# Patient Record
Sex: MALE | Race: WHITE | NOT HISPANIC OR LATINO | Employment: OTHER | URBAN - METROPOLITAN AREA
[De-identification: names, ages, dates, MRNs, and addresses within clinical notes are randomized per-mention and may not be internally consistent; named-entity substitution may affect disease eponyms.]

---

## 2021-12-01 ENCOUNTER — TRANSFERRED RECORDS (OUTPATIENT)
Dept: HEALTH INFORMATION MANAGEMENT | Facility: CLINIC | Age: 71
End: 2021-12-01

## 2022-01-19 ENCOUNTER — TRANSFERRED RECORDS (OUTPATIENT)
Dept: HEALTH INFORMATION MANAGEMENT | Facility: CLINIC | Age: 72
End: 2022-01-19
Payer: COMMERCIAL

## 2022-01-20 ENCOUNTER — TRANSFERRED RECORDS (OUTPATIENT)
Dept: HEALTH INFORMATION MANAGEMENT | Facility: CLINIC | Age: 72
End: 2022-01-20
Payer: COMMERCIAL

## 2022-01-21 ENCOUNTER — TRANSCRIBE ORDERS (OUTPATIENT)
Dept: OTHER | Age: 72
End: 2022-01-21
Payer: COMMERCIAL

## 2022-01-21 ENCOUNTER — MEDICAL CORRESPONDENCE (OUTPATIENT)
Dept: HEALTH INFORMATION MANAGEMENT | Facility: CLINIC | Age: 72
End: 2022-01-21
Payer: COMMERCIAL

## 2022-01-21 DIAGNOSIS — C64.2 CLEAR CELL CARCINOMA OF LEFT KIDNEY (H): ICD-10-CM

## 2022-01-21 DIAGNOSIS — M25.551 ACUTE RIGHT HIP PAIN: Primary | ICD-10-CM

## 2022-01-24 ENCOUNTER — TELEPHONE (OUTPATIENT)
Dept: ORTHOPEDICS | Facility: CLINIC | Age: 72
End: 2022-01-24
Payer: COMMERCIAL

## 2022-01-24 NOTE — TELEPHONE ENCOUNTER
1/25 30 min slot Dr Dinero   1/31 7am Dr Hoff  2/1 30 min slot Dr Dinero   2/3 8:30am Dr Hoff  2/3 2pm, 3pm, 4pm Dr Luke

## 2022-01-24 NOTE — TELEPHONE ENCOUNTER
Patient has a referral to Ortho Oncology regarding Acute right hip pain /Clear cell carcinoma of left kidney. Please assist patient with scheduling appt within appropriate time frame at [phone]. Thank you!

## 2022-01-26 NOTE — TELEPHONE ENCOUNTER
RECORDS RECEIVED FROM: right femoral head lesion // Acute right hip pain /Clear cell carcinoma of left kidney   DATE RECEIVED: Feb 4, 2022     NOTES STATUS DETAILS   OFFICE NOTE from referring provider Received Peri Wilkinson MD     MEDICATION LIST Internal    CT SCAN Received    XRAYS (IMAGES & REPORTS) Received        01/28/22   9:10 AM   FAXED REQUEST TO [facility]  Radha Guzman CMA      01/27/22   3:12 PM   RECEIVED FAX FROM , NO IMAGES THERE IMAGES ARE AT [facility]  Radha Guzman CMA    01/26/22   4:53 PM    FAXED REQUEST TO  FOR IMAGES  Radha Guzman CMA

## 2022-02-04 ENCOUNTER — OFFICE VISIT (OUTPATIENT)
Dept: ORTHOPEDICS | Facility: CLINIC | Age: 72
End: 2022-02-04
Payer: COMMERCIAL

## 2022-02-04 ENCOUNTER — PRE VISIT (OUTPATIENT)
Dept: ORTHOPEDICS | Facility: CLINIC | Age: 72
End: 2022-02-04
Payer: COMMERCIAL

## 2022-02-04 VITALS — BODY MASS INDEX: 26.66 KG/M2 | HEIGHT: 65 IN | WEIGHT: 160 LBS

## 2022-02-04 DIAGNOSIS — M16.7 OTHER SECONDARY OSTEOARTHRITIS OF RIGHT HIP: Primary | ICD-10-CM

## 2022-02-04 DIAGNOSIS — M25.551 ACUTE RIGHT HIP PAIN: ICD-10-CM

## 2022-02-04 DIAGNOSIS — C64.2 CLEAR CELL CARCINOMA OF LEFT KIDNEY (H): ICD-10-CM

## 2022-02-04 PROCEDURE — 99204 OFFICE O/P NEW MOD 45 MIN: CPT | Performed by: ORTHOPAEDIC SURGERY

## 2022-02-04 RX ORDER — ACETAMINOPHEN 500 MG
500 TABLET ORAL EVERY 8 HOURS PRN
Status: ON HOLD | COMMUNITY
End: 2022-03-15

## 2022-02-04 RX ORDER — TRAMADOL HYDROCHLORIDE 50 MG/1
50 TABLET ORAL EVERY 6 HOURS PRN
COMMUNITY
Start: 2021-12-23 | End: 2022-06-21

## 2022-02-04 ASSESSMENT — MIFFLIN-ST. JEOR: SCORE: 1405.76

## 2022-02-04 NOTE — LETTER
2/4/2022         RE: Maximilian Kapoor  [address]  [address]  [place] [place] [address]        Dear Colleague,    Thank you for referring your patient, Maximilian Kapoor, to the [facility] [place]. Please see a copy of my visit note below.    This patient was diagnosed a few months ago with renal cell carcinoma.  He has received 3 rounds of infusion chemotherapy.  He is also had 13 treatments of radiation to the right hip.  He has had persistent right hip pain especially with ambulating.    On examination he is alert oriented has a normal mood and affect and is in no acute distress peer respirations are regular and unlabored eyes are nonicteric.  He is walking with an assistive device.  And is right lower extremity there is no edema or significant erythema.    I reviewed his CT scan and he has a large lytic lesion in the anterior portion of the right femoral head and neck.  There is some collapse of the subchondral bone of the head.  This is undoubtably pain source.  This is consistent with a renal cell carcinoma.    In renal cell carcinoma lesions with this profound bone loss is unlikely that he is going to heal this back in.  Renal cell carcinoma is notoriously resistant to radiation and so we expect that this will begin growing at some point also.  The patient also has significant arthritis in the right hip.  I do not think he is going to get any pain relief until we treat him surgically.  I have recommended a total hip arthroplasty with resection of the involved bone.  He should be able to receive a fairly conventional hip replacement given where the tumor is at this time.  I would not want the tumor to grow as we would lose bone that we would normally want to support a total hip.  This is a great moment to get him fixed.  Our ability to resect the entire area of involved bone will also minimize the risk of persistent and recurrent tumor.  The patient is interested in this plan  going forward.  I would want his immune system and blood counts to be ready for surgery and so he will discuss this with his medical oncologist.  I know he is due for an infusion in 6 days so he will meet with his medical oncologist in 4 days and decide if they are going forward with the surgery now or after his next infusion treatment.  I have answered all of his questions today.      Again, thank you for allowing me to participate in the care of your patient.        Sincerely,        Kaveh Luke MD

## 2022-02-04 NOTE — NURSING NOTE
Teaching Flowsheet   Relevant Diagnosis: Wide resection right femur tumor and right total hip arthroplasty       Teaching Topic: preop     Person(s) involved in teaching:   Patient and Wife     Motivation Level:  Asks Questions: Yes  Eager to Learn: Yes  Cooperative: Yes  Receptive (willing/able to accept information): Yes  Any cultural factors/Gnosticist beliefs that may influence understanding or compliance? No       Patient demonstrates understanding of the following:  Reason for the appointment, diagnosis and treatment plan: Yes  Knowledge of proper use of medications and conditions for which they are ordered (with special attention to potential side effects or drug interactions): Yes  Which situations necessitate calling provider and whom to contact: Yes       Teaching Concerns Addressed:        Proper use and care of soap (medical equip, care aids, etc.): Yes  Nutritional needs and diet plan: Yes  Pain management techniques: Yes  Wound Care: Yes  How and/when to access community resources: Yes     Instructional Materials Used/Given: surgery packet reviewed with patient and his wife.  He will speak with his Oncologist on Tuesday as to the timing of his surgery and his infusion.  He will then call Fadumo and she will give OR date and schedule a virtual appt with PAC as well.  All contents of the jt booklet reviewed.  He will call if any questions arise.  His daughter will take care of him after surgery, since his wife is still recovering from her fall and surgery.  She is still on crutches.

## 2022-02-04 NOTE — LETTER
Date:February 6, 2022      Patient was self referred, no letter generated. Do not send.        [facility] Health Information

## 2022-02-04 NOTE — PROGRESS NOTES
This patient was diagnosed a few months ago with renal cell carcinoma.  He has received 3 rounds of infusion chemotherapy.  He is also had 13 treatments of radiation to the right hip.  He has had persistent right hip pain especially with ambulating.    On examination he is alert oriented has a normal mood and affect and is in no acute distress peer respirations are regular and unlabored eyes are nonicteric.  He is walking with an assistive device.  And is right lower extremity there is no edema or significant erythema.    I reviewed his CT scan and he has a large lytic lesion in the anterior portion of the right femoral head and neck.  There is some collapse of the subchondral bone of the head.  This is undoubtably pain source.  This is consistent with a renal cell carcinoma.    In renal cell carcinoma lesions with this profound bone loss is unlikely that he is going to heal this back in.  Renal cell carcinoma is notoriously resistant to radiation and so we expect that this will begin growing at some point also.  The patient also has significant arthritis in the right hip.  I do not think he is going to get any pain relief until we treat him surgically.  I have recommended a total hip arthroplasty with resection of the involved bone.  He should be able to receive a fairly conventional hip replacement given where the tumor is at this time.  I would not want the tumor to grow as we would lose bone that we would normally want to support a total hip.  This is a great moment to get him fixed.  Our ability to resect the entire area of involved bone will also minimize the risk of persistent and recurrent tumor.  The patient is interested in this plan going forward.  I would want his immune system and blood counts to be ready for surgery and so he will discuss this with his medical oncologist.  I know he is due for an infusion in 6 days so he will meet with his medical oncologist in 4 days and decide if they are going  forward with the surgery now or after his next infusion treatment.  I have answered all of his questions today.

## 2022-02-04 NOTE — NURSING NOTE
"Reason For Visit:   Chief Complaint   Patient presents with     Consult     right femoral head lesion and acutre right hip pain referred by Dr. Enrique Salinas at [facility] //  finished right hip radiation about a month ago at [facility] // currently doing chemo therapy        Ht 1.64 m (5' 4.57\")   Wt 72.6 kg (160 lb)   BMI 26.98 kg/m      Pain Assessment  Patient Currently in Pain: Yes  0-10 Pain Scale: 3  Primary Pain Location: Hip (right hip area)        Soila Dempsey ATC    "

## 2022-02-08 ENCOUNTER — TELEPHONE (OUTPATIENT)
Dept: ORTHOPEDICS | Facility: CLINIC | Age: 72
End: 2022-02-08
Payer: COMMERCIAL

## 2022-02-08 NOTE — TELEPHONE ENCOUNTER
Returned patient's voicemail about scheduling surgery. Left voicemail with [phone] as call back number.

## 2022-02-08 NOTE — TELEPHONE ENCOUNTER
Patient is scheduled for surgery with Dr. Luke    Spoke with: Benjamin    Date of Surgery: 3/14/2022    Location: [facility]    Informed patient they will need an adult  yes    Pre op with Provider n/a    H&P: Scheduled with pcp    Pre-procedure COVID-19 Test: patient will do this locally    Additional imaging/appointments: n/a    Surgery packet: Given in clinic     Additional comments: n/a

## 2022-02-14 DIAGNOSIS — Z11.59 ENCOUNTER FOR SCREENING FOR OTHER VIRAL DISEASES: Primary | ICD-10-CM

## 2022-03-11 RX ORDER — AMLODIPINE BESYLATE 10 MG/1
5 TABLET ORAL EVERY MORNING
Status: ON HOLD | COMMUNITY
End: 2022-03-14

## 2022-03-13 ENCOUNTER — ANESTHESIA EVENT (OUTPATIENT)
Dept: SURGERY | Facility: CLINIC | Age: 72
DRG: 470 | End: 2022-03-13
Payer: COMMERCIAL

## 2022-03-14 ENCOUNTER — APPOINTMENT (OUTPATIENT)
Dept: GENERAL RADIOLOGY | Facility: CLINIC | Age: 72
DRG: 470 | End: 2022-03-14
Attending: ORTHOPAEDIC SURGERY
Payer: COMMERCIAL

## 2022-03-14 ENCOUNTER — ANESTHESIA (OUTPATIENT)
Dept: SURGERY | Facility: CLINIC | Age: 72
DRG: 470 | End: 2022-03-14
Payer: COMMERCIAL

## 2022-03-14 ENCOUNTER — HOSPITAL ENCOUNTER (INPATIENT)
Facility: CLINIC | Age: 72
LOS: 1 days | Discharge: HOME HEALTH | DRG: 470 | End: 2022-03-15
Attending: ORTHOPAEDIC SURGERY | Admitting: ORTHOPAEDIC SURGERY
Payer: COMMERCIAL

## 2022-03-14 DIAGNOSIS — Z98.890 STATUS POST SURGERY: ICD-10-CM

## 2022-03-14 DIAGNOSIS — C40.21: Primary | ICD-10-CM

## 2022-03-14 LAB
ABO/RH(D): NORMAL
ANTIBODY SCREEN: NEGATIVE
GLUCOSE BLDC GLUCOMTR-MCNC: 115 MG/DL (ref 70–99)
SPECIMEN EXPIRATION DATE: NORMAL

## 2022-03-14 PROCEDURE — 88311 DECALCIFY TISSUE: CPT | Mod: TC | Performed by: ORTHOPAEDIC SURGERY

## 2022-03-14 PROCEDURE — 258N000001 HC RX 258: Performed by: ORTHOPAEDIC SURGERY

## 2022-03-14 PROCEDURE — C1776 JOINT DEVICE (IMPLANTABLE): HCPCS | Performed by: ORTHOPAEDIC SURGERY

## 2022-03-14 PROCEDURE — 99232 SBSQ HOSP IP/OBS MODERATE 35: CPT | Performed by: PEDIATRICS

## 2022-03-14 PROCEDURE — 250N000025 HC SEVOFLURANE, PER MIN: Performed by: ORTHOPAEDIC SURGERY

## 2022-03-14 PROCEDURE — 27365 RESECT FEMUR/KNEE TUMOR: CPT | Mod: RT | Performed by: ORTHOPAEDIC SURGERY

## 2022-03-14 PROCEDURE — 27130 TOTAL HIP ARTHROPLASTY: CPT | Mod: RT | Performed by: ORTHOPAEDIC SURGERY

## 2022-03-14 PROCEDURE — 250N000009 HC RX 250

## 2022-03-14 PROCEDURE — 86901 BLOOD TYPING SEROLOGIC RH(D): CPT | Performed by: ORTHOPAEDIC SURGERY

## 2022-03-14 PROCEDURE — C1713 ANCHOR/SCREW BN/BN,TIS/BN: HCPCS | Performed by: ORTHOPAEDIC SURGERY

## 2022-03-14 PROCEDURE — 73502 X-RAY EXAM HIP UNI 2-3 VIEWS: CPT | Mod: 26 | Performed by: RADIOLOGY

## 2022-03-14 PROCEDURE — 99207 PR CONSULT E&M CHANGED TO SUBSEQUENT LEVEL: CPT | Performed by: PEDIATRICS

## 2022-03-14 PROCEDURE — 250N000013 HC RX MED GY IP 250 OP 250 PS 637: Performed by: STUDENT IN AN ORGANIZED HEALTH CARE EDUCATION/TRAINING PROGRAM

## 2022-03-14 PROCEDURE — 258N000003 HC RX IP 258 OP 636

## 2022-03-14 PROCEDURE — 258N000003 HC RX IP 258 OP 636: Performed by: STUDENT IN AN ORGANIZED HEALTH CARE EDUCATION/TRAINING PROGRAM

## 2022-03-14 PROCEDURE — 0SR90JA REPLACEMENT OF RIGHT HIP JOINT WITH SYNTHETIC SUBSTITUTE, UNCEMENTED, OPEN APPROACH: ICD-10-PCS | Performed by: ORTHOPAEDIC SURGERY

## 2022-03-14 PROCEDURE — 250N000011 HC RX IP 250 OP 636: Performed by: PHYSICIAN ASSISTANT

## 2022-03-14 PROCEDURE — 250N000009 HC RX 250: Performed by: ORTHOPAEDIC SURGERY

## 2022-03-14 PROCEDURE — 0QT60ZZ RESECTION OF RIGHT UPPER FEMUR, OPEN APPROACH: ICD-10-PCS | Performed by: ORTHOPAEDIC SURGERY

## 2022-03-14 PROCEDURE — 272N000001 HC OR GENERAL SUPPLY STERILE: Performed by: ORTHOPAEDIC SURGERY

## 2022-03-14 PROCEDURE — 250N000013 HC RX MED GY IP 250 OP 250 PS 637: Performed by: PHYSICIAN ASSISTANT

## 2022-03-14 PROCEDURE — 120N000002 HC R&B MED SURG/OB UMMC

## 2022-03-14 PROCEDURE — 250N000009 HC RX 250: Performed by: NURSE ANESTHETIST, CERTIFIED REGISTERED

## 2022-03-14 PROCEDURE — 999N000065 XR PELVIS AND HIP PORTABLE RIGHT 2 VIEWS

## 2022-03-14 PROCEDURE — 370N000017 HC ANESTHESIA TECHNICAL FEE, PER MIN: Performed by: ORTHOPAEDIC SURGERY

## 2022-03-14 PROCEDURE — 360N000078 HC SURGERY LEVEL 5, PER MIN: Performed by: ORTHOPAEDIC SURGERY

## 2022-03-14 PROCEDURE — 250N000011 HC RX IP 250 OP 636: Performed by: ANESTHESIOLOGY

## 2022-03-14 PROCEDURE — 250N000011 HC RX IP 250 OP 636: Performed by: STUDENT IN AN ORGANIZED HEALTH CARE EDUCATION/TRAINING PROGRAM

## 2022-03-14 PROCEDURE — 250N000013 HC RX MED GY IP 250 OP 250 PS 637: Performed by: ANESTHESIOLOGY

## 2022-03-14 PROCEDURE — 86850 RBC ANTIBODY SCREEN: CPT | Performed by: ORTHOPAEDIC SURGERY

## 2022-03-14 PROCEDURE — 250N000011 HC RX IP 250 OP 636

## 2022-03-14 PROCEDURE — 710N000010 HC RECOVERY PHASE 1, LEVEL 2, PER MIN: Performed by: ORTHOPAEDIC SURGERY

## 2022-03-14 PROCEDURE — 999N000141 HC STATISTIC PRE-PROCEDURE NURSING ASSESSMENT: Performed by: ORTHOPAEDIC SURGERY

## 2022-03-14 DEVICE — 127 DEGREE NECK ANGLE HIP STEM
Type: IMPLANTABLE DEVICE | Site: HIP | Status: FUNCTIONAL
Brand: SECUR-FIT

## 2022-03-14 DEVICE — 6.5MM LOW PROFILE HEX SCREW 45MM
Type: IMPLANTABLE DEVICE | Site: HIP | Status: FUNCTIONAL
Brand: TRIDENT II

## 2022-03-14 DEVICE — CLUSTER ACETABULAR SHELL
Type: IMPLANTABLE DEVICE | Site: HIP | Status: FUNCTIONAL
Brand: TRIDENT

## 2022-03-14 DEVICE — FEMORAL HEAD
Type: IMPLANTABLE DEVICE | Site: HIP | Status: FUNCTIONAL
Brand: LFIT

## 2022-03-14 DEVICE — TRIDENT X3 0 DEGREE POLYETHYLENE INSERT
Type: IMPLANTABLE DEVICE | Site: HIP | Status: FUNCTIONAL
Brand: TRIDENT X3 INSERT

## 2022-03-14 DEVICE — 6.5MM LOW PROFILE HEX SCREW 35MM
Type: IMPLANTABLE DEVICE | Site: HIP | Status: FUNCTIONAL
Brand: TRIDENT II

## 2022-03-14 RX ORDER — HYDROMORPHONE HCL IN WATER/PF 6 MG/30 ML
0.2 PATIENT CONTROLLED ANALGESIA SYRINGE INTRAVENOUS
Status: DISCONTINUED | OUTPATIENT
Start: 2022-03-14 | End: 2022-03-15 | Stop reason: HOSPADM

## 2022-03-14 RX ORDER — NALOXONE HYDROCHLORIDE 0.4 MG/ML
0.4 INJECTION, SOLUTION INTRAMUSCULAR; INTRAVENOUS; SUBCUTANEOUS
Status: DISCONTINUED | OUTPATIENT
Start: 2022-03-14 | End: 2022-03-15 | Stop reason: HOSPADM

## 2022-03-14 RX ORDER — AMLODIPINE BESYLATE 5 MG/1
5 TABLET ORAL DAILY
COMMUNITY

## 2022-03-14 RX ORDER — POLYETHYLENE GLYCOL 3350 17 G/17G
17 POWDER, FOR SOLUTION ORAL DAILY
Status: DISCONTINUED | OUTPATIENT
Start: 2022-03-15 | End: 2022-03-15 | Stop reason: HOSPADM

## 2022-03-14 RX ORDER — DEXAMETHASONE SODIUM PHOSPHATE 4 MG/ML
INJECTION, SOLUTION INTRA-ARTICULAR; INTRALESIONAL; INTRAMUSCULAR; INTRAVENOUS; SOFT TISSUE PRN
Status: DISCONTINUED | OUTPATIENT
Start: 2022-03-14 | End: 2022-03-14

## 2022-03-14 RX ORDER — CEFAZOLIN SODIUM/WATER 2 G/20 ML
2 SYRINGE (ML) INTRAVENOUS SEE ADMIN INSTRUCTIONS
Status: DISCONTINUED | OUTPATIENT
Start: 2022-03-14 | End: 2022-03-14 | Stop reason: HOSPADM

## 2022-03-14 RX ORDER — ONDANSETRON 4 MG/1
4 TABLET, ORALLY DISINTEGRATING ORAL EVERY 30 MIN PRN
Status: DISCONTINUED | OUTPATIENT
Start: 2022-03-14 | End: 2022-03-14 | Stop reason: HOSPADM

## 2022-03-14 RX ORDER — HYDROMORPHONE HCL IN WATER/PF 6 MG/30 ML
0.4 PATIENT CONTROLLED ANALGESIA SYRINGE INTRAVENOUS
Status: DISCONTINUED | OUTPATIENT
Start: 2022-03-14 | End: 2022-03-15 | Stop reason: HOSPADM

## 2022-03-14 RX ORDER — NALOXONE HYDROCHLORIDE 0.4 MG/ML
0.2 INJECTION, SOLUTION INTRAMUSCULAR; INTRAVENOUS; SUBCUTANEOUS
Status: DISCONTINUED | OUTPATIENT
Start: 2022-03-14 | End: 2022-03-15 | Stop reason: HOSPADM

## 2022-03-14 RX ORDER — CEFAZOLIN SODIUM 1 G/3ML
1 INJECTION, POWDER, FOR SOLUTION INTRAMUSCULAR; INTRAVENOUS EVERY 8 HOURS
Status: COMPLETED | OUTPATIENT
Start: 2022-03-14 | End: 2022-03-15

## 2022-03-14 RX ORDER — ONDANSETRON 4 MG/1
4 TABLET, ORALLY DISINTEGRATING ORAL EVERY 6 HOURS PRN
Status: DISCONTINUED | OUTPATIENT
Start: 2022-03-14 | End: 2022-03-15 | Stop reason: HOSPADM

## 2022-03-14 RX ORDER — BISACODYL 10 MG
10 SUPPOSITORY, RECTAL RECTAL DAILY PRN
Status: DISCONTINUED | OUTPATIENT
Start: 2022-03-14 | End: 2022-03-15 | Stop reason: HOSPADM

## 2022-03-14 RX ORDER — CALCIUM CARBONATE 500 MG/1
500 TABLET, CHEWABLE ORAL 4 TIMES DAILY PRN
Status: DISCONTINUED | OUTPATIENT
Start: 2022-03-14 | End: 2022-03-15 | Stop reason: HOSPADM

## 2022-03-14 RX ORDER — SODIUM CHLORIDE, SODIUM LACTATE, POTASSIUM CHLORIDE, CALCIUM CHLORIDE 600; 310; 30; 20 MG/100ML; MG/100ML; MG/100ML; MG/100ML
INJECTION, SOLUTION INTRAVENOUS CONTINUOUS
Status: DISCONTINUED | OUTPATIENT
Start: 2022-03-14 | End: 2022-03-14 | Stop reason: HOSPADM

## 2022-03-14 RX ORDER — HYDROMORPHONE HCL IN WATER/PF 6 MG/30 ML
0.2 PATIENT CONTROLLED ANALGESIA SYRINGE INTRAVENOUS EVERY 5 MIN PRN
Status: DISCONTINUED | OUTPATIENT
Start: 2022-03-14 | End: 2022-03-14 | Stop reason: HOSPADM

## 2022-03-14 RX ORDER — ACETAMINOPHEN 325 MG/1
975 TABLET ORAL EVERY 8 HOURS
Status: DISCONTINUED | OUTPATIENT
Start: 2022-03-14 | End: 2022-03-15 | Stop reason: HOSPADM

## 2022-03-14 RX ORDER — LIDOCAINE 40 MG/G
CREAM TOPICAL
Status: DISCONTINUED | OUTPATIENT
Start: 2022-03-14 | End: 2022-03-14 | Stop reason: HOSPADM

## 2022-03-14 RX ORDER — ONDANSETRON 2 MG/ML
4 INJECTION INTRAMUSCULAR; INTRAVENOUS EVERY 6 HOURS PRN
Status: DISCONTINUED | OUTPATIENT
Start: 2022-03-14 | End: 2022-03-15 | Stop reason: HOSPADM

## 2022-03-14 RX ORDER — TRANEXAMIC ACID 650 MG/1
1950 TABLET ORAL ONCE
Status: COMPLETED | OUTPATIENT
Start: 2022-03-14 | End: 2022-03-14

## 2022-03-14 RX ORDER — FENTANYL CITRATE 50 UG/ML
INJECTION, SOLUTION INTRAMUSCULAR; INTRAVENOUS PRN
Status: DISCONTINUED | OUTPATIENT
Start: 2022-03-14 | End: 2022-03-14

## 2022-03-14 RX ORDER — LIDOCAINE HYDROCHLORIDE 20 MG/ML
INJECTION, SOLUTION INFILTRATION; PERINEURAL PRN
Status: DISCONTINUED | OUTPATIENT
Start: 2022-03-14 | End: 2022-03-14

## 2022-03-14 RX ORDER — OXYCODONE HYDROCHLORIDE 5 MG/1
5 TABLET ORAL EVERY 4 HOURS PRN
Status: DISCONTINUED | OUTPATIENT
Start: 2022-03-14 | End: 2022-03-15 | Stop reason: HOSPADM

## 2022-03-14 RX ORDER — HYDROXYZINE HYDROCHLORIDE 10 MG/1
10 TABLET, FILM COATED ORAL EVERY 6 HOURS PRN
Status: DISCONTINUED | OUTPATIENT
Start: 2022-03-14 | End: 2022-03-15 | Stop reason: HOSPADM

## 2022-03-14 RX ORDER — LABETALOL HYDROCHLORIDE 5 MG/ML
5-10 INJECTION, SOLUTION INTRAVENOUS
Status: DISCONTINUED | OUTPATIENT
Start: 2022-03-14 | End: 2022-03-14 | Stop reason: HOSPADM

## 2022-03-14 RX ORDER — OXYCODONE HYDROCHLORIDE 10 MG/1
10 TABLET ORAL EVERY 4 HOURS PRN
Status: DISCONTINUED | OUTPATIENT
Start: 2022-03-14 | End: 2022-03-15 | Stop reason: HOSPADM

## 2022-03-14 RX ORDER — ONDANSETRON 2 MG/ML
INJECTION INTRAMUSCULAR; INTRAVENOUS PRN
Status: DISCONTINUED | OUTPATIENT
Start: 2022-03-14 | End: 2022-03-14

## 2022-03-14 RX ORDER — PROPOFOL 10 MG/ML
INJECTION, EMULSION INTRAVENOUS PRN
Status: DISCONTINUED | OUTPATIENT
Start: 2022-03-14 | End: 2022-03-14

## 2022-03-14 RX ORDER — OXYCODONE HYDROCHLORIDE 5 MG/1
5 TABLET ORAL EVERY 4 HOURS PRN
Status: DISCONTINUED | OUTPATIENT
Start: 2022-03-14 | End: 2022-03-14 | Stop reason: HOSPADM

## 2022-03-14 RX ORDER — METHOCARBAMOL 500 MG/1
500 TABLET, FILM COATED ORAL EVERY 6 HOURS PRN
Status: DISCONTINUED | OUTPATIENT
Start: 2022-03-14 | End: 2022-03-15 | Stop reason: HOSPADM

## 2022-03-14 RX ORDER — FENTANYL CITRATE 50 UG/ML
25 INJECTION, SOLUTION INTRAMUSCULAR; INTRAVENOUS EVERY 5 MIN PRN
Status: DISCONTINUED | OUTPATIENT
Start: 2022-03-14 | End: 2022-03-14 | Stop reason: HOSPADM

## 2022-03-14 RX ORDER — DEXMEDETOMIDINE HYDROCHLORIDE 4 UG/ML
INJECTION, SOLUTION INTRAVENOUS PRN
Status: DISCONTINUED | OUTPATIENT
Start: 2022-03-14 | End: 2022-03-14

## 2022-03-14 RX ORDER — SODIUM CHLORIDE, SODIUM LACTATE, POTASSIUM CHLORIDE, CALCIUM CHLORIDE 600; 310; 30; 20 MG/100ML; MG/100ML; MG/100ML; MG/100ML
INJECTION, SOLUTION INTRAVENOUS CONTINUOUS PRN
Status: DISCONTINUED | OUTPATIENT
Start: 2022-03-14 | End: 2022-03-14

## 2022-03-14 RX ORDER — HYDROXYZINE HYDROCHLORIDE 10 MG/1
10 TABLET, FILM COATED ORAL EVERY 6 HOURS PRN
COMMUNITY

## 2022-03-14 RX ORDER — ACETAMINOPHEN 325 MG/1
650 TABLET ORAL EVERY 4 HOURS PRN
Status: DISCONTINUED | OUTPATIENT
Start: 2022-03-17 | End: 2022-03-15 | Stop reason: HOSPADM

## 2022-03-14 RX ORDER — SODIUM CHLORIDE, SODIUM LACTATE, POTASSIUM CHLORIDE, CALCIUM CHLORIDE 600; 310; 30; 20 MG/100ML; MG/100ML; MG/100ML; MG/100ML
INJECTION, SOLUTION INTRAVENOUS CONTINUOUS
Status: DISCONTINUED | OUTPATIENT
Start: 2022-03-14 | End: 2022-03-15 | Stop reason: HOSPADM

## 2022-03-14 RX ORDER — CEFAZOLIN SODIUM/WATER 2 G/20 ML
2 SYRINGE (ML) INTRAVENOUS
Status: COMPLETED | OUTPATIENT
Start: 2022-03-14 | End: 2022-03-14

## 2022-03-14 RX ORDER — PROCHLORPERAZINE MALEATE 5 MG
5 TABLET ORAL EVERY 6 HOURS PRN
Status: DISCONTINUED | OUTPATIENT
Start: 2022-03-14 | End: 2022-03-15 | Stop reason: HOSPADM

## 2022-03-14 RX ORDER — LIDOCAINE 40 MG/G
CREAM TOPICAL
Status: DISCONTINUED | OUTPATIENT
Start: 2022-03-14 | End: 2022-03-15 | Stop reason: HOSPADM

## 2022-03-14 RX ORDER — ONDANSETRON 2 MG/ML
4 INJECTION INTRAMUSCULAR; INTRAVENOUS EVERY 30 MIN PRN
Status: DISCONTINUED | OUTPATIENT
Start: 2022-03-14 | End: 2022-03-14 | Stop reason: HOSPADM

## 2022-03-14 RX ORDER — MAGNESIUM HYDROXIDE 1200 MG/15ML
LIQUID ORAL PRN
Status: DISCONTINUED | OUTPATIENT
Start: 2022-03-14 | End: 2022-03-14 | Stop reason: HOSPADM

## 2022-03-14 RX ORDER — AMOXICILLIN 250 MG
1 CAPSULE ORAL 2 TIMES DAILY
Status: DISCONTINUED | OUTPATIENT
Start: 2022-03-14 | End: 2022-03-15 | Stop reason: HOSPADM

## 2022-03-14 RX ADMIN — HYDROMORPHONE HYDROCHLORIDE 0.25 MG: 1 INJECTION, SOLUTION INTRAMUSCULAR; INTRAVENOUS; SUBCUTANEOUS at 12:56

## 2022-03-14 RX ADMIN — SUGAMMADEX 200 MG: 100 INJECTION, SOLUTION INTRAVENOUS at 15:02

## 2022-03-14 RX ADMIN — PHENYLEPHRINE HYDROCHLORIDE 100 MCG: 10 INJECTION INTRAVENOUS at 12:26

## 2022-03-14 RX ADMIN — TRANEXAMIC ACID 1950 MG: 650 TABLET ORAL at 09:02

## 2022-03-14 RX ADMIN — PHENYLEPHRINE HYDROCHLORIDE 50 MCG: 10 INJECTION INTRAVENOUS at 13:49

## 2022-03-14 RX ADMIN — ACETAMINOPHEN 975 MG: 325 TABLET, FILM COATED ORAL at 16:35

## 2022-03-14 RX ADMIN — FENTANYL CITRATE 25 MCG: 50 INJECTION, SOLUTION INTRAMUSCULAR; INTRAVENOUS at 14:58

## 2022-03-14 RX ADMIN — Medication 12 MCG: at 13:02

## 2022-03-14 RX ADMIN — PHENYLEPHRINE HYDROCHLORIDE 50 MCG: 10 INJECTION INTRAVENOUS at 14:03

## 2022-03-14 RX ADMIN — LABETALOL HYDROCHLORIDE 5 MG: 5 INJECTION, SOLUTION INTRAVENOUS at 16:19

## 2022-03-14 RX ADMIN — ONDANSETRON 4 MG: 2 INJECTION INTRAMUSCULAR; INTRAVENOUS at 12:51

## 2022-03-14 RX ADMIN — ROCURONIUM BROMIDE 20 MG: 50 INJECTION, SOLUTION INTRAVENOUS at 12:59

## 2022-03-14 RX ADMIN — HYDROMORPHONE HYDROCHLORIDE 0.25 MG: 1 INJECTION, SOLUTION INTRAMUSCULAR; INTRAVENOUS; SUBCUTANEOUS at 12:51

## 2022-03-14 RX ADMIN — DEXAMETHASONE SODIUM PHOSPHATE 4 MG: 4 INJECTION, SOLUTION INTRAMUSCULAR; INTRAVENOUS at 12:56

## 2022-03-14 RX ADMIN — SODIUM CHLORIDE, POTASSIUM CHLORIDE, SODIUM LACTATE AND CALCIUM CHLORIDE: 600; 310; 30; 20 INJECTION, SOLUTION INTRAVENOUS at 17:23

## 2022-03-14 RX ADMIN — HYDROMORPHONE HYDROCHLORIDE 0.2 MG: 1 INJECTION, SOLUTION INTRAMUSCULAR; INTRAVENOUS; SUBCUTANEOUS at 16:11

## 2022-03-14 RX ADMIN — SENNOSIDES AND DOCUSATE SODIUM 1 TABLET: 50; 8.6 TABLET ORAL at 20:30

## 2022-03-14 RX ADMIN — ROCURONIUM BROMIDE 30 MG: 50 INJECTION, SOLUTION INTRAVENOUS at 13:42

## 2022-03-14 RX ADMIN — CEFAZOLIN 1 G: 1 INJECTION, POWDER, FOR SOLUTION INTRAMUSCULAR; INTRAVENOUS at 20:30

## 2022-03-14 RX ADMIN — FENTANYL CITRATE 25 MCG: 50 INJECTION INTRAMUSCULAR; INTRAVENOUS at 15:33

## 2022-03-14 RX ADMIN — FENTANYL CITRATE 50 MCG: 50 INJECTION, SOLUTION INTRAMUSCULAR; INTRAVENOUS at 12:12

## 2022-03-14 RX ADMIN — FENTANYL CITRATE 50 MCG: 50 INJECTION, SOLUTION INTRAMUSCULAR; INTRAVENOUS at 13:16

## 2022-03-14 RX ADMIN — ROCURONIUM BROMIDE 50 MG: 50 INJECTION, SOLUTION INTRAVENOUS at 12:18

## 2022-03-14 RX ADMIN — PROPOFOL 50 MG: 10 INJECTION, EMULSION INTRAVENOUS at 12:19

## 2022-03-14 RX ADMIN — HYDROMORPHONE HYDROCHLORIDE 0.5 MG: 1 INJECTION, SOLUTION INTRAMUSCULAR; INTRAVENOUS; SUBCUTANEOUS at 13:25

## 2022-03-14 RX ADMIN — FENTANYL CITRATE 25 MCG: 50 INJECTION INTRAMUSCULAR; INTRAVENOUS at 15:26

## 2022-03-14 RX ADMIN — FENTANYL CITRATE 25 MCG: 50 INJECTION INTRAMUSCULAR; INTRAVENOUS at 16:03

## 2022-03-14 RX ADMIN — Medication 2 G: at 12:24

## 2022-03-14 RX ADMIN — PROPOFOL 150 MG: 10 INJECTION, EMULSION INTRAVENOUS at 12:17

## 2022-03-14 RX ADMIN — SODIUM CHLORIDE, POTASSIUM CHLORIDE, SODIUM LACTATE AND CALCIUM CHLORIDE: 600; 310; 30; 20 INJECTION, SOLUTION INTRAVENOUS at 12:08

## 2022-03-14 RX ADMIN — LIDOCAINE HYDROCHLORIDE 60 MG: 20 INJECTION, SOLUTION INFILTRATION; PERINEURAL at 12:17

## 2022-03-14 RX ADMIN — FENTANYL CITRATE 50 MCG: 50 INJECTION, SOLUTION INTRAMUSCULAR; INTRAVENOUS at 12:34

## 2022-03-14 RX ADMIN — FENTANYL CITRATE 25 MCG: 50 INJECTION INTRAMUSCULAR; INTRAVENOUS at 15:45

## 2022-03-14 RX ADMIN — OXYCODONE HYDROCHLORIDE 5 MG: 5 TABLET ORAL at 16:35

## 2022-03-14 RX ADMIN — FENTANYL CITRATE 50 MCG: 50 INJECTION, SOLUTION INTRAMUSCULAR; INTRAVENOUS at 13:06

## 2022-03-14 RX ADMIN — SODIUM CHLORIDE, POTASSIUM CHLORIDE, SODIUM LACTATE AND CALCIUM CHLORIDE: 600; 310; 30; 20 INJECTION, SOLUTION INTRAVENOUS at 15:07

## 2022-03-14 RX ADMIN — FENTANYL CITRATE 25 MCG: 50 INJECTION, SOLUTION INTRAMUSCULAR; INTRAVENOUS at 14:47

## 2022-03-14 ASSESSMENT — ACTIVITIES OF DAILY LIVING (ADL)
ADLS_ACUITY_SCORE: 7
ADLS_ACUITY_SCORE: 9
ADLS_ACUITY_SCORE: 9
ADLS_ACUITY_SCORE: 7
ADLS_ACUITY_SCORE: 7
ADLS_ACUITY_SCORE: 9
ADLS_ACUITY_SCORE: 7
ADLS_ACUITY_SCORE: 6
ADLS_ACUITY_SCORE: 9
ADLS_ACUITY_SCORE: 9
ADLS_ACUITY_SCORE: 7
ADLS_ACUITY_SCORE: 7

## 2022-03-14 ASSESSMENT — LIFESTYLE VARIABLES: TOBACCO_USE: 0

## 2022-03-14 ASSESSMENT — COPD QUESTIONNAIRES: COPD: 0

## 2022-03-14 ASSESSMENT — ENCOUNTER SYMPTOMS: ORTHOPNEA: 0

## 2022-03-14 NOTE — ANESTHESIA POSTPROCEDURE EVALUATION
Patient: Maximilian Kapoor    Procedure: Procedure(s):  Wide resection right femur tumor and right total hip arthroplasty       Anesthesia Type:  General    Note:  Disposition: Inpatient   Postop Pain Control: Uneventful            Sign Out: Well controlled pain   PONV: No   Neuro/Psych: Uneventful            Sign Out: Acceptable/Baseline neuro status   Airway/Respiratory: Uneventful            Sign Out: Acceptable/Baseline resp. status   CV/Hemodynamics: Uneventful            Sign Out: Acceptable CV status; No obvious hypovolemia; No obvious fluid overload   Other NRE: NONE   DID A NON-ROUTINE EVENT OCCUR? No    Event details/Postop Comments:  Doing well. Alert, oriented. No sore throat, nausea, or problems with pain control. No numbness/tingling in the extremities and moving upper extremities spontaneously. SBP up to 180 in PACU, baseline 150s/90s. Treated with prn IV labetalol with BP down to 150s/90s. Patient denies any concerns.              Last vitals:  Vitals Value Taken Time   /98 03/14/22 1645   Temp 36.8  C (98.2  F) 03/14/22 1645   Pulse 81 03/14/22 1645   Resp 16 03/14/22 1645   SpO2 96 % 03/14/22 1648   Vitals shown include unvalidated device data.    Electronically Signed By: Deborah Thompson MD  March 14, 2022  4:49 PM

## 2022-03-14 NOTE — ANESTHESIA CARE TRANSFER NOTE
Patient: Maximilian Kapoor    Procedure: Procedure(s):  Wide resection right femur tumor and right total hip arthroplasty       Diagnosis: Clear cell carcinoma of left kidney (H) [C64.2]  Other secondary osteoarthritis of right hip [M16.7]  Diagnosis Additional Information: No value filed.    Anesthesia Type:   General     Note:    Oropharynx: oropharynx clear of all foreign objects and spontaneously breathing  Level of Consciousness: drowsy  Oxygen Supplementation: face mask  Level of Supplemental Oxygen (L/min / FiO2): 6    Dentition: dentition unchanged  Vital Signs Stable: post-procedure vital signs reviewed and stable  Report to RN Given: handoff report given  Patient transferred to: PACU  Comments: To PACU with 02, Spont RR. Monitors applied, VSS, PIV/airway patent, Report to RN all questions/concerns answered.     Handoff Report: Identifed the Patient, Identified the Reponsible Provider, Reviewed the pertinent medical history, Discussed the surgical course, Reviewed Intra-OP anesthesia mangement and issues during anesthesia, Set expectations for post-procedure period and Allowed opportunity for questions and acknowledgement of understanding      Vitals:  Vitals Value Taken Time   /127 03/14/22 1511   Temp 36.5    Pulse 81 03/14/22 1515   Resp 12 03/14/22 1515   SpO2 100 % 03/14/22 1515   Vitals shown include unvalidated device data.    Electronically Signed By: CRISTINA Austin CRNA  March 14, 2022  3:16 PM

## 2022-03-14 NOTE — PROGRESS NOTES
"  VS: BP (!) 170/88 (BP Location: Left arm)   Pulse (!) 124   Temp (!) 95.8  F (35.4  C) (Oral)   Resp 16   Ht 1.651 m (5' 5\")   Wt 72.3 kg (159 lb 6.3 oz)   SpO2 97%   BMI 26.52 kg/m     O2: Nasal cannula 2LPM, lung sounds clear and equal   Output: Voids in BR   Last BM: 3/13 per pt   Activity: as tolerated, pt ambulated to BR, gait belt walker   Skin: R hip incision   Pain: Lower back pain when sitting up, repositioning pt was effective, declined pain medication, ice applied to R hip   CMS: Intact, A/O x4, denies numbness/tingling   Dressing: R hip CDI   Diet: regular   LDA: R PIV infusing, L PIV SL   Equipment: IV pole, capno, walker, personal belongings   Plan: Continue to monitor   Additional Info: Pt has elevated BP, pt states this is his baseline, pt is pleasant to talk to and able to make needs known       "

## 2022-03-14 NOTE — PHARMACY-ADMISSION MEDICATION HISTORY
Admission Medication History Completed by Pharmacy    See [facility] Admission Navigator for allergy information, preferred outpatient pharmacy, prior to admission medications and immunization status.     Medication History Sources:     Patient    Medication fill history    Changes made to PTA medication list (reason):    Added: Hydroxyzine    Deleted: None    Changed: Updated acetaminophen and tramadol sigs    Additional Information:    Losartan: 25 mg po daily. Pt has not started it yet. Will finish total 20 days of amlodipine 5 mg po daily. Then switch it to losartan.     Prior to Admission medications    Medication Sig Last Dose Taking? Auth Provider   acetaminophen (TYLENOL) 500 MG tablet Take 500 mg by mouth every 8 hours as needed  3/13/2022 at Unknown time Yes Reported, Patient   amLODIPine (NORVASC) 5 MG tablet Take 5 mg by mouth daily 3/12/2022 at Unknown time Yes Unknown, Entered By History   hydrOXYzine (ATARAX) 10 MG tablet Take 10 mg by mouth every 6 hours as needed for itching Past Month at Unknown time Yes Unknown, Entered By History   traMADol (ULTRAM) 50 MG tablet Take 50 mg by mouth every 6 hours as needed  3/13/2022 at Unknown time Yes Reported, Patient       Date completed: 03/14/22    Medication history completed by: Michela Del Rio [facility]

## 2022-03-14 NOTE — OP NOTE
DATE OF SURGERY: 3/14/2022    PREOPERATIVE DIAGNOSIS: #1 right femoral head and neck metastatic carcinoma, #2 right hip osteoarthritis    POSTOPERATIVE DIAGNOSIS: #1 right femoral head and neck metastatic carcinoma, #2 right hip osteoarthritis    PROCEDURE: #1 wide resection proximal femur, #2 right total hip arthroplasty    SURGEON: Kaveh Luke MD     ASSISTANT: Vivien Deras MD    PATIENT HISTORY: This patient has metastatic renal cell carcinoma affecting the right femoral head and neck.  He has had pain there.  He presents now to have this metastatic disease treated.  We are often doing wide resections were reconstruction is possible in the patient understands the risks of infection fracture pain bleeding leg length discrepancy limp numbness tingling weakness deep venous thrombosis pulmonary embolism.  He understands also this procedure is not expected to be curative with regard to his metastatic carcinoma.    DESCRIPTION OF PROCEDURE: The patient underwent successful induction of general anesthesia.  The right leg was washed and sterilely prepped and draped after he had been positioned in a lateral position with hip positioners.  We made an incision for a posterior approach to the hip sharply through skin divided subcutaneous tissue with cautery and opened up the muscle fascia.  Piriformis muscle was tagged to release the capsule split.  The hip was dislocated.  A femoral neck cut was lower than I usually would make it in order to get all the tumor out.  I made a low cut on the medial neck with a saw and then used osteotome over the lateral aspect to complete the resection.  The specimen was sent to pathology.  We also curetted out all the bone marrow at this level of the cut to ensure that we had cleared the area of tumor.  We cauterize a small bleeding vessel.  The bone had no remarkable bleeding.  We then I used the box osteotome, canal finder and lateralizer to start opening up the femur.  We did  straight reamers up to a size 9 and also broached up to a 9.  Next we turned our attention to the acetabulum.  I remove the labrum and we reamed up to a 52.  We impacted our cup and got a good press-fit.  We placed a trial liner and then tried a high offset +0 head.  We are happy with the leg lengths and the patient had almost 45 degrees of internal rotation 90 degrees of flexion.  Her to improve this to remove some of the anterior capsule and shave down a little of the greater trochanter.  The patient now was able to get to 55 or 60 degrees of internal rotation at 90 degrees of flexion.  We then removed all the trial components.  I placed 2 screws up into the acetabulum with excellent purchase.  The acetabular liner was impacted.  We before removing the broach of the femur use a calcar reamer, irrigated the canal, and then impacted the stem.  We tried a +0 head and were still happy with the stability and range of motion.  We then cleaned and dried the taper and impacted the stainless steel +0 head.  The hip was reduced and copiously irrigated.  We repaired the capsule with 1 Vicryl in reattach the piriformis to the tip of the greater trochanter with #1 Vicryl.  0 Vicryl was used in the gluteus fascia followed by 2-0 Vicryl in the subcutaneous tissue Monocryl in the skin Steri-Strips and a sterile dry dressing.  The patient was extubated and taken to the recovery room in stable condition.  There were no complications.  I was present for all critical portions of the procedure.  The estimated blood loss is 150 mL.    Kaveh Luke MD

## 2022-03-14 NOTE — OR NURSING
PACU to Inpatient Nursing Handoff    Patient Maximilian Kapoor is a 72 year old male who speaks English.   Procedure Procedure(s):  Wide resection right femur tumor and right total hip arthroplasty   Surgeon(s) Primary: Kaveh Luke MD  Resident - Assisting: Vivien Deras MD     No Known Allergies    Isolation  none    Past Medical History   has a past medical history of Gout, Hypertension, and Renal disease.    Anesthesia General   Dermatome Level     Preop Meds TXA 1950mg - time given: 0902   Nerve block Not applicable   Intraop Meds dexamethasone (Decadron)  dexmedetomidine (Precedex): 12 mcg total  fentanyl (Sublimaze): 250 mcg total  hydromorphone (Dilaudid): 1.0 mg total  ondansetron (Zofran): last given at 1251   Local Meds No   Antibiotics cefazolin (Ancef) - last given at 1224     Pain Patient Currently in Pain: yes   PACU meds  acetaminophen (Tylenol): 975 mg (total dose) last given at 1635   fentanyl (Sublimaze): 100 mcg (total dose) last given at 1613   hydromorphone (Dilaudid): 0.2 mg (total dose) last given at 1613   oxycodone (Roxicodone): 5 mg (total dose) last given at 1635    PCA / epidural No   Capnography Respiratory Monitoring (EtCO2): 37 mmHg  Integrated Pulmonary Index (IPI): 10   Telemetry ECG Rhythm: Normal sinus rhythm   Inpatient Telemetry Monitor Ordered? No        Labs Glucose Lab Results   Component Value Date     03/14/2022       Hgb No results found for: HGB    INR No results found for: INR   PACU Imaging Completed     Wound/Incision Incision/Surgical Site 03/14/22 Right;Lateral;Upper Thigh (Active)   Incision Assessment UTV 03/14/22 1511   Jenise-Incision Assessment UTV 03/14/22 1511   Closure RASHIDA 03/14/22 1511   Dressing Intervention Clean, dry, intact 03/14/22 1511   Number of days: 0       Incision/Surgical Site 03/14/22 Right Hip (Active)   Number of days: 0      CMS        Equipment ice pack and abductor pillow   Other LDA       IV Access Peripheral  IV 03/14/22 Anterior;Right Wrist (Active)   Site Assessment WDL 03/14/22 1511   Line Status Infusing 03/14/22 1511   Dressing Intervention New dressing  03/14/22 0914   Phlebitis Scale 0-->no symptoms 03/14/22 1511   Infiltration Scale 0 03/14/22 1511   Number of days: 0       Peripheral IV 03/14/22 Left Hand (Active)   Site Assessment WDL 03/14/22 1511   Line Status Saline locked 03/14/22 1511   Phlebitis Scale 0-->no symptoms 03/14/22 1511   Infiltration Scale 0 03/14/22 1511   Number of days: 0      Blood Products Not applicable  mL   Intake/Output Date 03/14/22 0700 - 03/15/22 0659   Shift 9545-3194 4796-3220 4997-2718 24 Hour Total   INTAKE   P.O.  60  60   I.V. 900 248  1148   Shift Total(mL/kg) 900(12.45) 308(4.26)  1208(16.71)   OUTPUT   Blood  150  150   Shift Total(mL/kg)  150(2.07)  150(2.07)   Weight (kg) 72.3 72.3 72.3 72.3      Drains / Hook     Time of void PreOp Void Prior to Procedure: 0800 (03/14/22 0917)    PostOp      Diapered? No   Bladder Scan Bladder Scan Volume (mL): 328 ml (03/14/22 1600)   PO 60 mL (03/14/22 1630)  tolerating sips     Vitals    B/P: (!) 155/97  T: 97.9  F (36.6  C)    Temp src: Oral  P:  Pulse: 78 (03/14/22 1630)          R: 12  O2:  SpO2: 97 %    O2 Device: None (Room air) (03/14/22 0822)    Oxygen Delivery: 2 LPM (03/14/22 1615)         Family/support present none present. Wife updated via phone   Patient belongings  returned to patient in PACU   Patient transported on bed   DC meds/scripts (obs/outpt) Not applicable   Inpatient Pain Meds Released? Yes       Special needs/considerations None   Tasks needing completion None       Noe Reddy RN  ASCOM [id number]

## 2022-03-14 NOTE — BRIEF OP NOTE
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 Item Serial No.  Lot No. LRB No. Used Action   IMP SHELL ACETABULUM HOWM 52MM 542-11-52E - [id number] Total Joint Component/Insert IMP SHELL ACETABULUM HOWM 52MM 542-11-52E  [employer] [id number] Right 1 Implanted   [place] Trident PSL HA Cluster Acetabular Shell Brain 52mm Alph Cde E Total Joint Component/Insert   YO [id number] Right 1 Implanted   STEM HIP SECUR-FIT MAX 127DEG - [id number] Total Joint Component/Insert STEM HIP SECUR-FIT MAX 127DEG  [employer] [id number] Right 1 Implanted   IMP SCR BONE STRK TORX 6.5X45MM CAN [id number] - [id number] Metallic Hardware/[place] IMP SCR BONE STRK TORX 6.5X45MM CAN [id number]  [employer] [id number] Right 1 Implanted   6.5MM LOW PROFILE HEX SCR 35MM - [id number] Metallic Hardware/[place] 6.5MM LOW PROFILE HEX SCR 35MM  [employer] [id number] Right 1 Implanted   IMP HEAD STRK FEMORAL C-TAPER COCR LFIT 36MM +0MM  - [id number] Total Joint Component/Insert IMP HEAD STRK FEMORAL C-TAPER COCR LFIT 36MM +0MM   [employer] [id number] Right 1 Implanted     Drains: none  Intra-op Labs/Cxs/Specimens:   Specimens:   ID Type Source Tests Collected by Time Destination   1 : RIGHT PROXIMAL FEMUR Tissue Femur, Right SURGICAL PATHOLOGY EXAM Kaveh Luke MD 3/14/2022  2:20 PM      Complications: No apparent complications during procedure  Findings: Please see dictated operative note for details    Disposition:  Stable to PACU, then admit to Orthopaedics    POST OPERATIVE PLAN    Assessment/Plan: Maximilian Kapoor is a 72-year-old male with renal cell carcinoma with lytic lesion to right femoral head and neck s/p radiation and chemo, HTN, HLD, hout now s/p right proximal femur resection and total hip arthroplasty on 3/14/2022 with Dr. Luke.    Ortho Primary  Activity: Up with assist and assistive devices as needed until independent. Posterior hip precautions to operative side x 3 months:  1) No hip flexion beyond 90 degrees  2) No adduction beyond midline  3) No internal rotation beyond neutral   Weight bearing status: WBAT  Antibiotics: Cefazolin x 24 hours  Diet: Regular. Bowel regimen. Anti-emetics PRN.    DVT prophylaxis: Mechanical + Lovenox 40mg daily x 4 weeks starting POD1  Elevation: Elevate heels off of bed on pillows   Wound Care: Aquacel x 7 days.    Drains: none  Pain management: Orals PRN, IV for breakthrough only  X-rays: AP Pelvis and Lateral operative hip in PACU.   Physical Therapy: Mobilization, ROM, ADL's, Posterior hip precautions.    Occupational Therapy: ADL's  Labs: Trend Hgb on POD #1, 2  Consults: PT, OT, Medicine. Appreciate assistance in caring for this patient throughout the perioperative period  Disposition: Pending progress with therapies, pain control on orals, and medical stability, anticipate discharge home on POD1-2    Future Appointments   Date Time Provider Department Center   3/15/2022  8:15 AM Ela Rivera OT [facility] [place]   3/15/2022 10:30 AM Carlyn Jeronimo, PT [facility] [place]   3/15/2022  4:00 PM Carlyn Jeronimo, PT [facility] [place]   4/1/2022  9:20 AM Eloina Lazcano PA-C [facility]       Vivien Deras MD  Orthopaedic Surgery PGY4

## 2022-03-14 NOTE — ANESTHESIA PREPROCEDURE EVALUATION
Anesthesia Pre-Procedure Evaluation    Patient: Maximilian Kapoor   MRN: [id number] : 1950        Procedure : Procedure(s):  Wide resection right femur tumor and right total hip arthroplasty          Past Medical History:   Diagnosis Date     Gout      Hypertension      Renal disease     clear cell renal cancer chemo and radiation      Past Surgical History:   Procedure Laterality Date     GENITOURINARY SURGERY      CT biopsy kidney      No Known Allergies   Social History     Tobacco Use     Smoking status: Former Smoker     Smokeless tobacco: Never Used     Tobacco comment:  quit   Substance Use Topics     Alcohol use: Not on file     Comment: 2 cans beer per weeek      Wt Readings from Last 1 Encounters:   22 72.3 kg (159 lb 6.3 oz)        Anesthesia Evaluation   Pt has not had prior anesthetic         ROS/MED HX  ENT/Pulmonary:    (-) tobacco use, asthma, COPD and recent URI   Neurologic:       Cardiovascular:     (+) hypertension-range: 150/90/ ---- (-) ULRICH, orthopnea/PND and syncope   METS/Exercise Tolerance: 4 - Raking leaves, gardening    Hematologic:       Musculoskeletal:       GI/Hepatic:    (-) GERD   Renal/Genitourinary:     (+) renal disease (Renal cell carcinoma, eGFR wnl),     Endo:       Psychiatric/Substance Use:     (+) H/O chronic opiod use  (Tramadol 50mg ~2x/day).     Infectious Disease:       Malignancy:   (+) Malignancy (Renal cell carcinoma),     Other:            Physical Exam    Airway        Mallampati: II   TM distance: > 3 FB   Neck ROM: full   Mouth opening: > 3 cm    Respiratory Devices and Support         Dental       (+) missing      Cardiovascular          Rhythm and rate: regular and normal     Pulmonary           breath sounds clear to auscultation           OUTSIDE LABS:  CBC: No results found for: WBC, HGB, HCT, PLT  BMP:   Lab Results   Component Value Date     (H) 2022     COAGS: No results found for: PTT, INR, FIBR  POC: No results found for:  BGM, HCG, HCGS  HEPATIC: No results found for: ALBUMIN, PROTTOTAL, ALT, AST, GGT, ALKPHOS, BILITOTAL, BILIDIRECT, CARLIN  OTHER: No results found for: PH, LACT, A1C, SCOTT, PHOS, MAG, LIPASE, AMYLASE, TSH, T4, T3, CRP, SED    Anesthesia Plan    ASA Status:  2   NPO Status:  NPO Appropriate    Anesthesia Type: General.     - Airway: ETT   Induction: Intravenous.   Maintenance: Balanced.   Techniques and Equipment:     - Lines/Monitors: BIS     Consents    Anesthesia Plan(s) and associated risks, benefits, and realistic alternatives discussed. Questions answered and patient/representative(s) expressed understanding.    - Discussed:     - Discussed with:  Patient    Use of blood products discussed: Yes.     - Discussed with: Patient.     - Consented: consented to blood products            Reason for refusal: other.     Postoperative Care    Pain management: IV analgesics, Oral pain medications.   PONV prophylaxis: Ondansetron (or other 5HT-3), Dexamethasone or Solumedrol, Background Propofol Infusion     Comments:    Other Comments: Discussed risks of general anesthesia, including aspiration pneumonia, sore throat/hoarse voice, abrasions/damage to lips/tongue/teeth, nausea, rare complications (including medication reactions, cardiac, pulmonary, hypoxia/low oxygen, recall). Ensured understanding, invited questions and all questions were answered. Patient wishes to proceed.            Deborah Thompson MD

## 2022-03-15 ENCOUNTER — APPOINTMENT (OUTPATIENT)
Dept: PHYSICAL THERAPY | Facility: CLINIC | Age: 72
DRG: 470 | End: 2022-03-15
Attending: ORTHOPAEDIC SURGERY
Payer: COMMERCIAL

## 2022-03-15 ENCOUNTER — APPOINTMENT (OUTPATIENT)
Dept: OCCUPATIONAL THERAPY | Facility: CLINIC | Age: 72
DRG: 470 | End: 2022-03-15
Attending: ORTHOPAEDIC SURGERY
Payer: COMMERCIAL

## 2022-03-15 VITALS
HEART RATE: 96 BPM | BODY MASS INDEX: 26.56 KG/M2 | WEIGHT: 159.39 LBS | SYSTOLIC BLOOD PRESSURE: 161 MMHG | RESPIRATION RATE: 20 BRPM | DIASTOLIC BLOOD PRESSURE: 89 MMHG | OXYGEN SATURATION: 95 % | TEMPERATURE: 97.2 F | HEIGHT: 65 IN

## 2022-03-15 LAB
CREAT SERPL-MCNC: 0.97 MG/DL (ref 0.66–1.25)
GFR SERPL CREATININE-BSD FRML MDRD: 83 ML/MIN/1.73M2
GLUCOSE BLDC GLUCOMTR-MCNC: 120 MG/DL (ref 70–99)
HGB BLD-MCNC: 13.6 G/DL (ref 13.3–17.7)
PLATELET # BLD AUTO: 287 10E3/UL (ref 150–450)

## 2022-03-15 PROCEDURE — 250N000011 HC RX IP 250 OP 636: Performed by: STUDENT IN AN ORGANIZED HEALTH CARE EDUCATION/TRAINING PROGRAM

## 2022-03-15 PROCEDURE — 85049 AUTOMATED PLATELET COUNT: CPT | Performed by: ORTHOPAEDIC SURGERY

## 2022-03-15 PROCEDURE — 250N000013 HC RX MED GY IP 250 OP 250 PS 637: Performed by: PEDIATRICS

## 2022-03-15 PROCEDURE — 97161 PT EVAL LOW COMPLEX 20 MIN: CPT | Mod: GP

## 2022-03-15 PROCEDURE — 97165 OT EVAL LOW COMPLEX 30 MIN: CPT | Mod: GO

## 2022-03-15 PROCEDURE — 99232 SBSQ HOSP IP/OBS MODERATE 35: CPT | Performed by: INTERNAL MEDICINE

## 2022-03-15 PROCEDURE — 97110 THERAPEUTIC EXERCISES: CPT | Mod: GP

## 2022-03-15 PROCEDURE — 36415 COLL VENOUS BLD VENIPUNCTURE: CPT | Performed by: STUDENT IN AN ORGANIZED HEALTH CARE EDUCATION/TRAINING PROGRAM

## 2022-03-15 PROCEDURE — 250N000013 HC RX MED GY IP 250 OP 250 PS 637: Performed by: STUDENT IN AN ORGANIZED HEALTH CARE EDUCATION/TRAINING PROGRAM

## 2022-03-15 PROCEDURE — 97530 THERAPEUTIC ACTIVITIES: CPT | Mod: GP

## 2022-03-15 PROCEDURE — 97530 THERAPEUTIC ACTIVITIES: CPT | Mod: GO

## 2022-03-15 PROCEDURE — 97116 GAIT TRAINING THERAPY: CPT | Mod: GP

## 2022-03-15 PROCEDURE — 82565 ASSAY OF CREATININE: CPT | Performed by: STUDENT IN AN ORGANIZED HEALTH CARE EDUCATION/TRAINING PROGRAM

## 2022-03-15 PROCEDURE — 85018 HEMOGLOBIN: CPT | Performed by: STUDENT IN AN ORGANIZED HEALTH CARE EDUCATION/TRAINING PROGRAM

## 2022-03-15 PROCEDURE — 97535 SELF CARE MNGMENT TRAINING: CPT | Mod: GO

## 2022-03-15 RX ORDER — AMLODIPINE BESYLATE 5 MG/1
5 TABLET ORAL ONCE
Status: COMPLETED | OUTPATIENT
Start: 2022-03-15 | End: 2022-03-15

## 2022-03-15 RX ORDER — AMLODIPINE BESYLATE 5 MG/1
5 TABLET ORAL DAILY
Status: DISCONTINUED | OUTPATIENT
Start: 2022-03-16 | End: 2022-03-15 | Stop reason: HOSPADM

## 2022-03-15 RX ORDER — OXYCODONE HYDROCHLORIDE 5 MG/1
5-10 TABLET ORAL EVERY 4 HOURS PRN
Qty: 40 TABLET | Refills: 0 | Status: SHIPPED | OUTPATIENT
Start: 2022-03-15

## 2022-03-15 RX ORDER — ACETAMINOPHEN 325 MG/1
650 TABLET ORAL EVERY 4 HOURS PRN
Qty: 60 TABLET | Refills: 0 | Status: SHIPPED | OUTPATIENT
Start: 2022-03-17

## 2022-03-15 RX ORDER — LABETALOL HYDROCHLORIDE 5 MG/ML
10 INJECTION, SOLUTION INTRAVENOUS EVERY 4 HOURS PRN
Status: DISCONTINUED | OUTPATIENT
Start: 2022-03-15 | End: 2022-03-15 | Stop reason: HOSPADM

## 2022-03-15 RX ORDER — AMOXICILLIN 250 MG
1 CAPSULE ORAL 2 TIMES DAILY
Qty: 30 TABLET | Refills: 0 | Status: SHIPPED | OUTPATIENT
Start: 2022-03-15

## 2022-03-15 RX ORDER — POLYETHYLENE GLYCOL 3350 17 G/17G
17 POWDER, FOR SOLUTION ORAL DAILY
Qty: 7 PACKET | Refills: 0 | Status: SHIPPED | OUTPATIENT
Start: 2022-03-15

## 2022-03-15 RX ADMIN — AMLODIPINE BESYLATE 5 MG: 5 TABLET ORAL at 01:34

## 2022-03-15 RX ADMIN — ACETAMINOPHEN 975 MG: 325 TABLET, FILM COATED ORAL at 09:27

## 2022-03-15 RX ADMIN — ENOXAPARIN SODIUM 40 MG: 40 INJECTION SUBCUTANEOUS at 09:28

## 2022-03-15 RX ADMIN — OXYCODONE HYDROCHLORIDE 10 MG: 10 TABLET ORAL at 18:13

## 2022-03-15 RX ADMIN — ACETAMINOPHEN 975 MG: 325 TABLET, FILM COATED ORAL at 00:30

## 2022-03-15 RX ADMIN — ACETAMINOPHEN 975 MG: 325 TABLET, FILM COATED ORAL at 16:06

## 2022-03-15 RX ADMIN — SENNOSIDES AND DOCUSATE SODIUM 1 TABLET: 50; 8.6 TABLET ORAL at 09:28

## 2022-03-15 RX ADMIN — OXYCODONE HYDROCHLORIDE 10 MG: 10 TABLET ORAL at 09:28

## 2022-03-15 RX ADMIN — CEFAZOLIN 1 G: 1 INJECTION, POWDER, FOR SOLUTION INTRAMUSCULAR; INTRAVENOUS at 03:46

## 2022-03-15 RX ADMIN — OXYCODONE HYDROCHLORIDE 5 MG: 5 TABLET ORAL at 01:34

## 2022-03-15 RX ADMIN — POLYETHYLENE GLYCOL 3350 17 G: 17 POWDER, FOR SOLUTION ORAL at 09:27

## 2022-03-15 ASSESSMENT — ACTIVITIES OF DAILY LIVING (ADL)
DEPENDENT_IADLS:: INDEPENDENT
ADLS_ACUITY_SCORE: 9
ADLS_ACUITY_SCORE: 11
ADLS_ACUITY_SCORE: 9
ADLS_ACUITY_SCORE: 11
ADLS_ACUITY_SCORE: 9
ADLS_ACUITY_SCORE: 11
ADLS_ACUITY_SCORE: 9

## 2022-03-15 NOTE — CONSULTS
Care Management Initial Consult    General Information  Assessment completed with: Benjamin Manzo  Type of CM/SW Visit: Initial Assessment    Primary Care Provider verified and updated as needed:     Readmission within the last 30 days:           Advance Care Planning:            Communication Assessment  Patient's communication style: spoken language (English or Bilingual)    Hearing Difficulty or Deaf: no   Wear Glasses or Blind: yes    Cognitive  Cognitive/Neuro/Behavioral: WDL                      Living Environment:   People in home: spouse     Current living Arrangements: house      Able to return to prior arrangements:         Family/Social Support:  Care provided by:    Provides care for:    Marital Status:              Description of Support System:           Current Resources:   Patient receiving home care services:       Community Resources:    Equipment currently used at home: cane, quad  Supplies currently used at home:      Employment/Financial:  Employment Status:          Financial Concerns:             Lifestyle & Psychosocial Needs:  Social Determinants of Health     Tobacco Use: Medium Risk     Smoking Tobacco Use: Former Smoker     Smokeless Tobacco Use: Never Used   Alcohol Use: Not on file   Financial Resource Strain: Not on file   Food Insecurity: Not on file   Transportation Needs: Not on file   Physical Activity: Not on file   Stress: Not on file   Social Connections: Not on file   Intimate Partner Violence: Not on file   Depression: Not at risk     PHQ-2 Score: 0   Housing Stability: Not on file       Functional Status:  Prior to admission patient needed assistance:   Dependent ADLs:: Independent  Dependent IADLs:: Independent       Mental Health Status:      n/a    Chemical Dependency Status:      n/a          Values/Beliefs:  Spiritual, Cultural Beliefs, Anabaptism Practices, Values that affect care:             none    Additional Information:  RNCC called patient for case management  assessment for discharge planning. Patient in need of HC PT/OT. Patient lives at home with his spouse, has home equipment available including crutches, walker, shower/tub seat, raised toilet seat, 2 steps into home with bars to help pull up in. Patient to discharge today to his home in [place], 2.5 hours away. Pt wanting HC set up through [facility]. RNCC called clinic and set this up, ph: [phone]. AVS will need to be sent to fax: [phone]. RNCC available for further planning.    [place], [place]  Ph: [phone]  Fax: [phone]    RUDOLPH Escalera, BA, RN, RNCC  Pager: [phone]  Phone: [phone]  Weekend/Holiday Pager: [phone]

## 2022-03-15 NOTE — PLAN OF CARE
Physical Therapy Discharge Summary    Reason for therapy discharge:    All goals and outcomes met, no further needs identified.    Progress towards therapy goal(s). See goals on Care Plan in [facility] electronic health record for goal details.  Goals met    Therapy recommendation(s):    Continued therapy is recommended.  Rationale/Recommendations:  home PT for safety evaluation and progression.

## 2022-03-15 NOTE — PROGRESS NOTES
03/15/22 1004   Quick Adds   Type of Visit Initial Occupational Therapy Evaluation   Living Environment   People in Home spouse   Current Living Arrangements house   Home Accessibility stairs to enter home   Number of Stairs, Main Entrance 2   Stair Railings, Main Entrance railings safe and in good condition;railings on both sides of stairs   Transportation Anticipated family or friend will provide   Living Environment Comments Pt has a good set-up in place at home. He has a tub/shower and shower chair. Pt has tall toilets.    Self-Care   Usual Activity Tolerance good   Current Activity Tolerance moderate   Regular Exercise No   Equipment Currently Used at Home cane, quad   Fall history within last six months no   Instrumental Activities of Daily Living (IADL)   IADL Comments Pt can have assist from wife or friend   General Information   Onset of Illness/Injury or Date of Surgery 03/14/22   Referring Physician Kaveh Luke MD   Patient/Family Therapy Goal Statement (OT) to go home   Additional Occupational Profile Info/Pertinent History of Current Problem Maximilian Kapoor is a 72-year-old male with renal cell carcinoma with lytic lesion to right femoral head and neck s/p radiation and chemo, HTN, HLD, hout now s/p right proximal femur resection and total hip arthroplasty on 3/14/2022 with Dr. Luke.   Right Lower Extremity (Weight-bearing Status) weight-bearing as tolerated (WBAT)   General Observations and Info Activity: up with A   Cognitive Status Examination   Orientation Status orientation to person, place and time   Visual Perception   Visual Impairment/Limitations WFL   Pain Assessment   Patient Currently in Pain Yes, see Vital Sign flowsheet   Range of Motion Comprehensive   Comment, General Range of Motion B UE WFL   Strength Comprehensive (MMT)   Comment, General Manual Muscle Testing (MMT) Assessment B UE WFL   Bed Mobility   Comment (Bed Mobility) min A   Transfers   Transfer Comments  CGA   Activities of Daily Living   BADL Assessment/Intervention lower body dressing   Lower Body Dressing Assessment/Training   [place] Level (Lower Body Dressing) maximum assist (25% patient effort)   Clinical Impression   Criteria for Skilled Therapeutic Interventions Met (OT) Yes, treatment indicated   OT Diagnosis decreased I with ADL   OT Problem List-Impairments impacting ADL activity tolerance impaired;pain;post-surgical precautions   Assessment of Occupational Performance 1-3 Performance Deficits   Identified Performance Deficits dressing, bathing, IADL   Planned Therapy Interventions (OT) ADL retraining;IADL retraining;transfer training   Clinical Decision Making Complexity (OT) low complexity   Anticipated Equipment Needs Upon Discharge (OT) dressing equipment   Risk & Benefits of therapy have been explained evaluation/treatment results reviewed;care plan/treatment goals reviewed;risks/benefits reviewed;current/potential barriers reviewed;participants voiced agreement with care plan;participants included;patient   OT Discharge Planning   OT Discharge Recommendation (DC Rec) home with assist;home with home care occupational therapy   OT Rationale for DC Rec Pt moving well but limited by pain and activity tolerance. Recommend assist as needed for IADL, HH OT for home safety eval and progression of ADL/IADL   OT Brief overview of current status SBA FWW   Therapy Certification   Start of Care Date 03/15/22   Medical Diagnosis s/p R YO   Total Evaluation Time (Minutes)   Total Evaluation Time (Minutes) 8   OT Goals   Therapy Frequency (OT) Daily   OT Predicated Duration/Target Date for Goal Attainment 03/16/22   OT Goals Lower Body Dressing;Toilet Transfer/Toileting;OT Goal 1   OT: Lower Body Dressing Modified independent;within precautions   OT: Toilet Transfer/Toileting Modified independent;within precautions   OT: Goal 1 Pt will complete tub transfer mod I within precautions

## 2022-03-15 NOTE — CONSULTS
"[facility]  Consult Note - Hospitalist Service  Date of Admission:  3/14/2022  Consult Requested by: Vivien Deras MD  Reason for Consult: Comanagement    Assessment & Plan   Maximilian Kapoor is a 72 year old male admitted on 3/14/2022. He is admitted for right proximal femur resection related to renal cell carcinoma metastasis to the right femoral head s/p proximal femur resection and right YO 3/14/22.    # Renal cell carcinoma with right femoral head metastasis   # s/p proximal femur resection and right YO 3/14/22  - Primary post-op management per orthopedics  - has not yet started ixitinib for RCC  - DVT ppx per orthopedics  - Pain management per orthopedics.    # HTN, has been elevated post-operatively  - Amlodipine 5mg PTA, would restart this evening  - Labetalol 10mg prn for SBP > 170  - Has not started losartan as an outpatient, so will hold off on this for now    # HLD, gout - not currently on medications as outpatient, no need to change during this hospitalization if no clinical change.     The patient's care was discussed with the Bedside Nurse and Patient.    Josse Mendes MD  [facility]  Securely message with the Vocera Web Console (learn more here)  Text page via [facility] Paging/Directory       Hospitalist Service    Clinically Significant Risk Factors Present on Admission                 # Overweight: Estimated body mass index is 26.52 kg/m  as calculated from the following:    Height as of this encounter: 1.651 m (5' 5\").    Weight as of this encounter: 72.3 kg (159 lb 6.3 oz).      ______________________________________________________________________    Chief Complaint   Right proximal femur head resection and right total hip arthroplasty for metastatic renal cell carcinoma.    History is obtained from the patient    History of Present Illness   Maximilian Kapoor is a 72 year old male with metastatic renal " cell carcinoma with right femoral head metastasis, hypertension, gout and hyperlipidemia who is admitted for resection of right femoral head metastatic lesion and hip arthroplasty. The patient underwent successful right proximal femur resection and right total hip arthroplasty on 3/14/22. Post operatively the patient has been hypertensive. He did receive a dose of labetalol 5mg but has had persistent hypertension to the 170s systolic. He reports that he recently started the amlodipine within the last week and is due to start losartan in a week or so. He reports that his hip pain has been controlled post-operatively and has been ~4/10. For his RCC he has not yet started ixitinib.    Review of Systems   The 10 point Review of Systems is negative other than noted in the HPI or here.     Past Medical History    I have reviewed this patient's medical history and updated it with pertinent information if needed.   Past Medical History:   Diagnosis Date     Gout      Hypertension      Renal disease     clear cell renal cancer chemo and radiation       Past Surgical History   I have reviewed this patient's surgical history and updated it with pertinent information if needed.  Past Surgical History:   Procedure Laterality Date     GENITOURINARY SURGERY      CT biopsy kidney       Social History   I have reviewed this patient's social history and updated it with pertinent information if needed.  Social History     Tobacco Use     Smoking status: Former Smoker     Smokeless tobacco: Never Used     Tobacco comment: 1981 quit   Substance Use Topics     Alcohol use: None     Comment: 2 cans beer per alix     Drug use: Not Currently       Family History     No family history of RCC    Medications   Medications Prior to Admission   Medication Sig Dispense Refill Last Dose     acetaminophen (TYLENOL) 500 MG tablet Take 500 mg by mouth every 8 hours as needed    3/13/2022 at Unknown time     amLODIPine (NORVASC) 5 MG tablet Take 5 mg  by mouth daily Taking 2 weeks prior and 2 weeks postop then will restart losartan/preop H&P   3/12/2022 at Unknown time     hydrOXYzine (ATARAX) 10 MG tablet Take 10 mg by mouth every 6 hours as needed for itching   Past Month at Unknown time     traMADol (ULTRAM) 50 MG tablet Take 50 mg by mouth every 6 hours as needed    3/13/2022 at Unknown time       Allergies   No Known Allergies    Physical Exam   Vital Signs: Temp: (!) 95.8  F (35.4  C) Temp src: Oral BP: (!) 171/99 Pulse: 102   Resp: 24 SpO2: 98 % O2 Device: None (Room air) Oxygen Delivery: 2 LPM  Weight: 159 lbs 6.28 oz    General Appearance: Patient lying in bed, NAD  Eyes: PERRL, clear sclera, EOMs with normal function  HEENT: Nares and oropharynx are clear, no neck goiter  Respiratory: Breathing comfortably, CTAB  Cardiovascular: RRR, no m/r/g, good radial pulses  GI: Soft, non-tender, non-distended  Lymph/Hematologic: No cervical or axillary adenopathy  Genitourinary: deferred  Skin: No rash or jaundice  Musculoskeletal: Right hip with lateral dressing that is clean without drainage. Normal tone  Neurologic: Alert and oriented. CNs with normal function  Psychiatric: Normal affect    Data   I personally reviewed the pelvis XR image(s) showing postsurgical right hip arthroplasty.  Results for orders placed or performed during the hospital encounter of 03/14/22 (from the past 24 hour(s))   Glucose by meter   Result Value Ref Range    GLUCOSE BY METER POCT 115 (H) 70 - 99 mg/dL   ABO/Rh type and screen - Pre-Op    Narrative    The following orders were created for panel order ABO/Rh type and screen - Pre-Op.  Procedure                               Abnormality         Status                     ---------                               -----------         ------                     Adult Type and Screen[[id number]]                            Final result                 Please view results for these tests on the individual orders.   Adult Type and Screen    Result Value Ref Range    ABO/RH(D) A POS     Antibody Screen Negative Negative    SPECIMEN EXPIRATION DATE [id number]    XR Pelvis w Hip Port Right G/E 2 Views    Narrative    3 views pelvis and right hip radiograph(s) 3/14/2022 3:58 PM    History: sp YO    Additional History from EMR: 72-year-old male with a history of renal  cell carcinoma and an osteolytic lesion in the proximal right femur.  Patient now status post same day resection and total hip arthroplasty.    Comparison: CT 1/20/2022, radiographs 1/19/2022    Findings:    AP view(s) of the pelvis, and AP and crosstable lateral views of the  right hip were obtained.     Postsurgical changes consistent with history of right total hip  arthroplasty. Hardware appears intact. No periprosthetic fracture.    Perioperative soft tissue swelling with multiple gas density lucencies  about the right hip.    Degenerative changes of the left hip with relative preservation of  joint space. Degenerative changes of the visualized lumbar spine.  Enthesopathic change of innominate bones and trochanters.     Sacrum and innominate bones are partially obscured by overlying bowel  gas/fecal content.      Impression    Impression: New postsurgical changes consistent with right total hip  arthroplasty in this patient with previous lytic lesion in the right  femoral head/neck.    I have personally reviewed the examination and initial interpretation  and I agree with the findings.    MCKAYLA TRESSA         SYSTEM ID:  [id number]

## 2022-03-15 NOTE — PROGRESS NOTES
03/15/22 0926   Quick Adds   Type of Visit Initial PT Evaluation       Present no   Language English   Living Environment   People in Home spouse   Current Living Arrangements house   Home Accessibility stairs to enter home   Number of Stairs, Main Entrance 2   Stair Railings, Main Entrance railings safe and in good condition;railings on both sides of stairs   Transportation Anticipated family or friend will provide   Self-Care   Usual Activity Tolerance good   Current Activity Tolerance moderate   Regular Exercise No   Equipment Currently Used at Home   (has cane, walker, crutches)   Fall history within last six months no   General Information   Onset of Illness/Injury or Date of Surgery 03/14/22   Referring Physician Kaveh Luke MD   Patient/Family Therapy Goals Statement (PT) Did not endorse   Pertinent History of Current Problem (include personal factors and/or comorbidities that impact the POC) s/p R YO   Existing Precautions/Restrictions fall;no hip IR;no hip ADD past midline;90 degree hip flexion;no pivoting or twisting   Weight-Bearing Status - LUE full weight-bearing   Weight-Bearing Status - RUE full weight-bearing   Weight-Bearing Status - LLE full weight-bearing   Weight-Bearing Status - RLE weight-bearing as tolerated   General Observations Supine in bed upon arrival, agreeable to PT   Cognition   Affect/Mental Status (Cognition) WNL   Orientation Status (Cognition) oriented x 3   Pain Assessment   Patient Currently in Pain Yes, see Vital Sign flowsheet   Integumentary/Edema   Integumentary/Edema Comments Patient with mild swelling present to R LE post-operatively   Posture    Posture Forward head position;Protracted shoulders   Posture Comments Mild to moderate sitting EOB and standing   Range of Motion (ROM)   ROM Comment Did not formally assess, demonstrates functional ROM within precautions with mobility   Strength (Manual Muscle Testing)   Strength Comments  Did not formally assess, demonstrates functional strength with mobility   Bed Mobility   Comment, (Bed Mobility) Completes supine to sit transfer with Chaim to R LE only   Transfers   Comment, (Transfers) Completes sit<>stand transfer with SBA and use of walker, increased time to come into standing   Gait/Stairs (Locomotion)   Comment, (Gait/Stairs) Ambulates in [place] with SBA using walker, fairly steady throughout   Balance   Balance Comments Independent sitting balance, SBA for standing balance with UE support from walker   Sensory Examination   Sensory Perception WNL   Clinical Impression   Criteria for Skilled Therapeutic Intervention Yes, treatment indicated   PT Diagnosis (PT) impaired functional mobility   Influenced by the following impairments increased post-op pain, precautions, decreased R LE strength and ROM   Functional limitations due to impairments impaired bed mobility, transfers and amblation   Clinical Presentation (PT Evaluation Complexity) Stable/Uncomplicated   Clinical Presentation Rationale s/p R YO, mobilizing well overall   Clinical Decision Making (Complexity) low complexity   Planned Therapy Interventions (PT) balance training;bed mobility training;gait training;home exercise program;stair training;strengthening;transfer training   Anticipated Equipment Needs at Discharge (PT)   (has all equipment)   Risk & Benefits of therapy have been explained evaluation/treatment results reviewed;care plan/treatment goals reviewed;risks/benefits reviewed;current/potential barriers reviewed   PT Discharge Planning   PT Discharge Recommendation (DC Rec) home with assist;home with home care physical therapy   PT Rationale for DC Rec PT: Home PT for safety evaluation and progression   PT Brief overview of current status PT: Chaim to R LE only for bed mobility, SBA for transfers and ambulation using walker. Completed 3 stairs with bilateral rails at SBA   Total Evaluation Time   Total Evaluation Time  (Minutes) 8   Physical Therapy Goals   PT Frequency 2x/day   PT Predicated Duration/Target Date for Goal Attainment 03/16/22   PT Goals Bed Mobility;Transfers;Gait;Stairs   PT: Bed Mobility Supervision/stand-by assist;Supine to/from sit;Within precautions   PT: Transfers Modified independent;Sit to/from stand;Bed to/from chair;Assistive device   PT: Gait Modified independent;Assistive device;100 feet   PT: Stairs Modified independent;2 stairs;Rail on left;Rail on right

## 2022-03-15 NOTE — PROGRESS NOTES
"[facility], [place]   Internal Medicine Daily Note           Interval History/Events     Reports doing well  No nausea, vomiting, chest pain shortness of breath  No fever, chills.        Review of Systems        4 point ROS including Respiratory, CV, GI and , other than that noted above is negative      Medications   I have reviewed current medications  in the \"current medication\" section of Epic.  Relevant changes include:     Physical Exam   General:       Vital signs:    Blood pressure (!) 153/82, pulse 89, temperature 97.8  F (36.6  C), temperature source Oral, resp. rate 22, height 1.651 m (5' 5\"), weight 72.3 kg (159 lb 6.3 oz), SpO2 99 %.  Estimated body mass index is 26.52 kg/m  as calculated from the following:    Height as of this encounter: 1.651 m (5' 5\").    Weight as of this encounter: 72.3 kg (159 lb 6.3 oz).      Intake/Output Summary (Last 24 hours) at 3/15/2022 1416  Last data filed at 3/15/2022 0348  Gross per 24 hour   Intake 1208 ml   Output 450 ml   Net 758 ml        Constitutional: Alert, awake, and oriented  Eye: S1, S2 normal.   Mouth/ENT: B/L CTA  Cardiovascular: S1, S2 normal.   Respiratory: B/L CTA  GI: Soft, NT, BS+  : NO Hook's catheter  Neurology: Alert, awake, and oriented.   Psych:   MSK:   Integumentary:   Heme/Lymph/Imm:      Laboratory and Imaging Studies     I have reviewed  laboratory and imaging studies in the Epic. Pertinent findings are as below:    BMP  Recent Labs   Lab 03/15/22  0630 03/15/22  0620 03/14/22  0828   CR 0.97  --   --    GLC  --  120* 115*     CBC  Recent Labs   Lab 03/15/22  0630   HGB 13.6        INRNo lab results found in last 7 days.  LFTsNo lab results found in last 7 days.   PANCNo lab results found in last 7 days.        Impression/Plan        72 year old male admitted on 3/14/2022. He is admitted for right proximal femur resection related to renal cell carcinoma metastasis to the right femoral head s/p proximal " femur resection and right YO 3/14/22.     # Renal cell carcinoma with right femoral head metastasis   # s/p proximal femur resection and right YO 3/14/22  - Primary post-op management per orthopedics  - has not yet started ixitinib for RCC  - DVT ppx per orthopedics  - Pain management per orthopedics.     # HTN, has been elevated post-operatively  - Continue Amlodipine 5mg PTA  - Labetalol 10mg prn for SBP > 170  - Has not started losartan as an outpatient, so will hold off on this for now     # HLD, gout:  not currently on medications as outpatient, no need to change during this hospitalization if no clinical change.            Pt's care was discussed with bedside RN, patient and  during Care Team Rounds.               Casimiro Botello MD  Hospitalist ( Internal medicine)  Pager: [phone]

## 2022-03-15 NOTE — PROGRESS NOTES
Orthopaedic Post-op Check    S:  Doing well post-operatively.  Reports pain is under good control. No nausea/vomiting.  No new numbness/tingling.    O:  General:  Pain is well controlled, no acute distress  Resp:  Breathing comfortably    RLE  - Dressing CDI  - Fires, gastroc/soleus, EHL, FHL, wiggles toes  - SILT to superficial peroneal, deep peroneal, saphenous, sural, and tibial nerve distributions  - 2+ DP and PT, foot warm and well perfused    Assessment/Plan: Maximilian Kapoor is a 72-year-old male with renal cell carcinoma with lytic lesion to right femoral head and neck s/p radiation and chemo, HTN, HLD, hout now s/p right proximal femur resection and total hip arthroplasty on 3/14/2022 with Dr. Luke.     Ortho Primary  Activity: Up with assist and assistive devices as needed until independent. Posterior hip precautions to operative side x 3 months:  1) No hip flexion beyond 90 degrees  2) No adduction beyond midline  3) No internal rotation beyond neutral   Weight bearing status: WBAT  Antibiotics: Cefazolin x 24 hours  Diet: Regular. Bowel regimen. Anti-emetics PRN.    DVT prophylaxis: Mechanical + Lovenox 40mg daily x 4 weeks starting POD1  Elevation: Elevate heels off of bed on pillows   Wound Care: Aquacel x 7 days.    Drains: none  Pain management: Orals PRN, IV for breakthrough only  X-rays: AP Pelvis and Lateral operative hip in PACU.   Physical Therapy: Mobilization, ROM, ADL's, Posterior hip precautions.    Occupational Therapy: ADL's  Labs: Trend Hgb on POD #1, 2  Consults: PT, OT, Medicine. Appreciate assistance in caring for this patient throughout the perioperative period  Disposition: Pending progress with therapies, pain control on orals, and medical stability, anticipate discharge home on POD1-2       Vivien Deras MD  PGY-4, Orthopaedic Surgery

## 2022-03-15 NOTE — PROGRESS NOTES
Orthopaedic Surgery Progress Note 03/15/2022    S: No acute events overnight. VS reviewed. Hypertensive overnight. Pain well controlled on oral meds. Denies numbness or tingling in the affected extremity. chest pain (-), SOB(-), nausea/vomiting(-). Tolerating oral diet. Voiding spontaneously. Has been OOB in his room.    O:  Temp: (!) 95.8  F (35.4  C) Temp src: Oral BP: (!) 153/89 Pulse: 102   Resp: 20 SpO2: 98 % O2 Device: None (Room air) Oxygen Delivery: 1 LPM    Exam:  Gen: No acute distress, resting comfortably in bed.  Resp: Non-labored breathing  MSK:    RLE:  - Dressings c/d/i  - SILT femoral/tibial/sural/saphenous/DP/SP nerves  - Fires Quad, TA, EHL, FHL, GaSC  - Foot wwp      No lab results found in last 7 days.    Invalid input(s): SEDRATE  No results found for: SED      Assessment/Plan: Maximilian Kapoor is a 72-year-old male with renal cell carcinoma with lytic lesion to right femoral head and neck s/p radiation and chemo, HTN, HLD, hout now s/p right proximal femur resection and total hip arthroplasty on 3/14/2022 with Dr. Luke.     Ortho Primary  Activity: Up with assist and assistive devices as needed until independent. Posterior hip precautions to operative side x 3 months:  1) No hip flexion beyond 90 degrees  2) No adduction beyond midline  3) No internal rotation beyond neutral   Weight bearing status: WBAT  Antibiotics: Cefazolin x 24 hours  Diet: Regular. Bowel regimen. Anti-emetics PRN.    DVT prophylaxis: Mechanical + Lovenox 40mg daily x 4 weeks starting POD1  Elevation: Elevate heels off of bed on pillows   Wound Care: Aquacel x 7 days.    Drains: none  Pain management: Orals PRN, IV for breakthrough only  X-rays: AP Pelvis and Lateral operative hip in PACU - complete  Physical Therapy: Mobilization, ROM, ADL's, Posterior hip precautions.    Occupational Therapy: ADL's  Labs: Trend Hgb on POD #1, 2  Consults: PT, OT, Medicine. Appreciate assistance in caring for this patient throughout  the perioperative period  Disposition: Pending progress with therapies, pain control on orals, and medical stability, anticipate discharge home on POD1-2     Future Appointments   Date Time Provider Department Center   3/15/2022  8:15 AM Ela Rivera, OT [facility] [place]   3/15/2022 10:30 AM Carlyn Jeronimo, PT [facility] [place]   3/15/2022  4:00 PM Carlyn Jeronimo, PT [facility] [place]   4/1/2022  9:20 AM [facility]       Patient discussed with Dr. Luke.      Vivien Deras MD  PGY-4  Orthopaedic Surgery

## 2022-03-15 NOTE — PROGRESS NOTES
"BP (!) 153/82   Pulse 89   Temp 97.8  F (36.6  C) (Oral)   Resp 22   Ht 1.651 m (5' 5\")   Wt 72.3 kg (159 lb 6.3 oz)   SpO2 99%   BMI 26.52 kg/m      Pain managed w/ current plan of care per patient.  Oxycodone provided relief along w/ scheduled Tylenol.      A1 w/ set up for adl's, mobility, transfers.  Is able to make his needs known.  Drsg remains CDI. Continent of B&B.  Passing flatus, appetite fair-adequate.  Lovenox for DVT prophylaxis.  Vdg w/o issue.  A/Ox4.     Rec'd call from  who states that the patient has home care set up and that once orders are rec'd to fax that to the HC agency.  The phone/fax number is in the Care Coordinators notes as follows:     Pt wanting HC set up through [facility]. RNCC called clinic and set this up, ph: [phone]. AVS will need to be sent to fax: [phone]. RNCC available for further planning.     "

## 2022-03-15 NOTE — PLAN OF CARE
"  VS: BP (!) 153/89   Pulse 102   Temp (!) 95.8  F (35.4  C) (Oral)   Resp 20   Ht 1.651 m (5' 5\")   Wt 72.3 kg (159 lb 6.3 oz)   SpO2 98%   BMI 26.52 kg/m      -Patient hypertensive overnight. Was ordered one dose 5mg amlodipine.   - Pt has scheduled amlodipine daily and prn labetalol.    O2: 98% at 2L    Output: Ambulates to the bathroom x1, uses urinal x1   Last BM: 03/13/22   Activity: Assist of 1, gait belt & walker    Skin: Right hip incision    Pain: Takes prn oxycodone 5mg x1    CMS: Alert & oriented x4, baseline slight tremors on bilateral hands    Dressing: CDI    Diet: Regular    LDA: PIV on R,  Infusing   PIV on L, saline locked    Equipment: IV pole, capno, PCD's, gait belt, walker, personal belongings   Plan: Continue plan of care    Additional Info: Stop light tool discussed with patient.       Patient vital signs are at baseline: Yes  Patient able to ambulate as they were prior to admission or with assist devices provided by therapies during their stay:  Yes  Patient MUST void prior to discharge:  Yes  Patient able to tolerate oral intake:  Yes  Pain has adequate pain control using Oral analgesics:  Yes  Does patient have an identified :  Yes  Has goal D/C date and time been discussed with patient:  No,  Reason:  No discharge date and time yet pending OT/PT progress.                       " Statement Selected

## 2022-03-16 NOTE — DISCHARGE SUMMARY
ORTHOPAEDIC SURGERY DISCHARGE SUMMARY     Date of Admission: 3/14/2022  Date of Discharge: 3/15/2022  6:15 PM  Disposition: Home  Staff Physician: Dr. 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 Luke on 3/14/2022    BRIEF HISTORY:  This patient has metastatic renal cell carcinoma affecting the right femoral head and neck.  He has had pain there.  He presents now to have this metastatic disease treated.  We are often doing wide resections were reconstruction is possible in the patient understands the risks of infection fracture pain bleeding leg length discrepancy limp numbness tingling weakness deep venous thrombosis pulmonary embolism.  He understands also this procedure is not expected to be curative with regard to his metastatic carcinoma.    HOSPITAL COURSE:    The patient was admitted following the above listed procedures for pain control and rehabilitation. Maximilian Kapoor did well post-operatively. Medicine was consulted post operatively to aid in management of medical co-morbidities. The patient received routine nursing cares and at the time of discharge was medically stable. Vital signs were stable throughout admission. The patient is tolerating a regular diet and is voiding spontaneously. All PT/OT goals have been met for safe mobility. Pain is now controlled on oral medications which will be available on discharge. Stool softeners have been used while taking pain medications to help prevent constipation. Maximilian Kapoor is deemed medically safe to discharge.     Antibiotics:  Ancef given periop and 24 hours postop.   DVT prophylaxis:  Lovenox initiated after surgery and will be continued for 4 weeks.   PT Progress:  Has met PT/OT goals for safe mobility.    Pain  Meds:  Weaned off all IV pain meds by discharge.  Inpatient Events: No significant events or complications.     PHYSICAL EXAM:    Gen: No acute distress, resting comfortably in bed.  Resp: Non-labored breathing  MSK:     RLE:  - Dressings c/d/i  - SILT femoral/tibial/sural/saphenous/DP/SP nerves  - Fires Quad, TA, EHL, FHL, GaSC  - Foot wwp    FOLLOWUP:    Follow up with Soumya Lazcano PA-C as below.     Future Appointments   Date Time Provider Department Center   4/1/2022  9:20 AM Eloina Lazcano PA-C [facility]       Orthopaedic Surgery appointments are at the [facility] ([address] [place] [address]). Call [phone] to schedule a follow-up appointment at this location with your provider.     PLANNED DISCHARGE ORDERS:     DVT Prophylaxis: Lovenox 40mg daily x 4 weeks     Activity: WBAT, posterior hip precautions     Wound Care: see Below      Discharge Medication List as of 3/15/2022  3:11 PM      START taking these medications    Details   enoxaparin ANTICOAGULANT (LOVENOX) 40 MG/0.4ML syringe Inject 0.4 mLs (40 mg) Subcutaneous every 24 hours, Disp-12 mL, R-0, E-Prescribe      oxyCODONE (ROXICODONE) 5 MG tablet Take 1-2 tablets (5-10 mg) by mouth every 4 hours as needed, Disp-40 tablet, R-0, E-Prescribe      polyethylene glycol (MIRALAX) 17 g packet Take 17 g by mouth daily, Disp-7 packet, R-0, E-Prescribe      senna-docusate (SENOKOT-S/PERICOLACE) 8.6-50 MG tablet Take 1 tablet by mouth 2 times daily, Disp-30 tablet, R-0, E-Prescribe         CONTINUE these medications which have CHANGED    Details   acetaminophen (TYLENOL) 325 MG tablet Take 2 tablets (650 mg) by mouth every 4 hours as needed for other, Disp-60 tablet, R-0, E-Prescribe         CONTINUE these medications which have NOT CHANGED    Details   amLODIPine (NORVASC) 5 MG tablet Take 5 mg by mouth daily Taking 2 weeks prior and 2 weeks postop then will restart losartan/preop H&P, Historical       hydrOXYzine (ATARAX) 10 MG tablet Take 10 mg by mouth every 6 hours as needed for itching, Historical      traMADol (ULTRAM) 50 MG tablet Take 50 mg by mouth every 6 hours as needed , Historical               Discharge Procedure Orders   Home Care Referral   Referral Priority: Routine: Next available opening Referral Type: Home Health Therapies & Aides   Number of Visits Requested: 1     Reason for your hospital stay   Order Comments: s/p right proximal femur resection and total hip arthroplasty on 3/14/2022 with Dr. Luke.     Activity   Order Comments: Your activity upon discharge: activity as tolerated    Activity: Up with assist and assistive devices as needed until independent. Posterior hip precautions to operative side x 3 months:  1) No hip flexion beyond 90 degrees  2) No adduction beyond midline  3) No internal rotation beyond neutral   Weight bearing status: WBAT     Order Specific Question Answer Comments   Is discharge order? Yes      Adult [facility] Follow-up and recommended labs and tests   Order Comments: Follow up with Dr Camacho as scheduled.  Clinic phone number is     Appointments on [place] and/or [facility] (with [facility] or [facility] provider or service). Call [phone] if you haven't heard regarding these appointments within 7 days of discharge.     When to contact your care team   Order Comments: Call Dr Camacho if you have any of the following: temperature greater than 101.3 or less than  96.5,  increased shortness of breath, increased drainage, increased swelling, or increased pain.     Wound care and dressings   Order Comments: Instructions to care for your wound at home: ice to area for comfort, keep wound clean and dry, may get incision wet in shower but do not soak or scrub, reinforce dressing as needed, and remove dressing in 7 days.     Crutches DME   Order Comments: DME Documentation: Describe the reason for need to support medical necessity: Impaired gait status  post hip surgery. I, the undersigned, certify that the above prescribed supplies are medically necessary for this patient and is both reasonable and necessary in reference to accepted standards of medical practice in the treatment of this patient's condition and is not prescribed as a convenience.     Order Specific Question Answer Comments   DME Provider: [employer]    Crutch Type: Standard    Crutches Add On: NA    Length of Need: Lifetime      Cane DME   Order Comments: DME Documentation: Describe the reason for need to support medical necessity: Impaired gait status post hip surgery. I, the undersigned, certify that the above prescribed supplies are medically necessary for this patient and is both reasonable and necessary in reference to accepted standards of medical practice in the treatment of this patient's condition and is not prescribed as a convenience.     Order Specific Question Answer Comments   DME Provider: [employer]    Cane Type: Single Tip    Reminder: Patient can typically get 1 every 5 years      Walker DME   Order Comments: : DME Documentation: Describe the reason for need to support medical necessity: Impaired gait status post hip surgery. I, the undersigned, certify that the above prescribed supplies are medically necessary for this patient and is both reasonable and necessary in reference to accepted standards of medical practice in the treatment of this patient's condition and is not prescribed as a convenience.     Order Specific Question Answer Comments   DME Provider: [employer]    Walker Type: Standard (2 Wheel)    Accessories: N/A      Diet   Order Comments: Follow this diet upon discharge: Orders Placed This Encounter      Advance Diet as Tolerated: Regular Diet Adult     Order Specific Question Answer Comments   Is discharge order? Yes            Vivien Deras MD  Orthopaedic Surgery, PGY4

## 2022-03-16 NOTE — PLAN OF CARE
Occupational Therapy Discharge Summary    Reason for therapy discharge:    Discharged to home.    Progress towards therapy goal(s). See goals on Care Plan in [facility] electronic health record for goal details.  Goals partially met.  Barriers to achieving goals:   discharge from facility.    Therapy recommendation(s):    Recommend assist as needed for IADL, HH OT for home safety eval and progression of ADL/IADL

## 2022-03-16 NOTE — DISCHARGE SUMMARY
DISCHARGE SUMMARY    Pt discharging to: home   Transportation: spouse and daughter via private vehicle   AVS given and discussed: yes   Stoplight Tool given and discussed: yes  Medications given: yes  Belongings returned: yes  Comments:

## 2022-03-16 NOTE — PLAN OF CARE
"VS: BP (!) 161/89 (BP Location: Left arm)   Pulse 96   Temp 97.2  F (36.2  C) (Oral)   Resp 20   Ht 1.651 m (5' 5\")   Wt 72.3 kg (159 lb 6.3 oz)   SpO2 95%   BMI 26.52 kg/m     O2: Sating >90% on RA. Lung sounds clear. Denies chest pain and SOB.   Output: Voids spontaneously and adequately.    Last BM: 3/15/22. Bowel sounds active x4. Passing flatus.    Activity: Up with 1 assist, FWW, and gait belt.    Up for meals? Yes.   Skin: R hip incision.    Pain: Pain was managed with PRN Oxy.    CMS: A&Ox4. Pt has baseline neuropathy and tremors.    Dressing: Dressing to R hip C/D/I.   Diet: Regular. Appetite was good. Denies N/V.    LDA: PIV access removed before discharge.    Equipment: IV pole, FWW, gait belt, and personal belongings.    Plan: Pt discharged home at 1615.   Additional Info:       Patient vital signs are at baseline: Yes  Patient able to ambulate as they were prior to admission or with assist devices provided by therapies during their stay:  Yes  Patient MUST void prior to discharge:  Yes  Patient able to tolerate oral intake:  Yes  Pain has adequate pain control using Oral analgesics:  Yes  Does patient have an identified :  Yes  Has goal D/C date and time been discussed with patient:  Yes      "

## 2022-03-26 LAB
PATH REPORT.COMMENTS IMP SPEC: NORMAL
PATH REPORT.COMMENTS IMP SPEC: NORMAL
PATH REPORT.FINAL DX SPEC: NORMAL
PATH REPORT.GROSS SPEC: NORMAL
PATH REPORT.MICROSCOPIC SPEC OTHER STN: NORMAL
PATH REPORT.RELEVANT HX SPEC: NORMAL
PHOTO IMAGE: NORMAL

## 2022-03-26 PROCEDURE — 88309 TISSUE EXAM BY PATHOLOGIST: CPT | Mod: 26 | Performed by: PATHOLOGY

## 2022-03-26 PROCEDURE — 88311 DECALCIFY TISSUE: CPT | Mod: 26 | Performed by: PATHOLOGY

## 2022-04-01 ENCOUNTER — OFFICE VISIT (OUTPATIENT)
Dept: ORTHOPEDICS | Facility: CLINIC | Age: 72
End: 2022-04-01
Payer: COMMERCIAL

## 2022-04-01 DIAGNOSIS — M25.551 ACUTE RIGHT HIP PAIN: Primary | ICD-10-CM

## 2022-04-01 PROCEDURE — 99024 POSTOP FOLLOW-UP VISIT: CPT | Performed by: PHYSICIAN ASSISTANT

## 2022-04-01 NOTE — LETTER
4/1/2022         RE: Maximilian JAY Antwon  [address]  [address]  [facility] [address]        Dear Colleague,    Thank you for referring your patient, Maximilian Kapoor, to the [facility] [place]. Please see a copy of my visit note below.    Chief Complaint: Right hip check  POSTOPERATIVE DIAGNOSIS: #1 right femoral head and neck metastatic carcinoma, #2 right hip osteoarthritis     3/14/22 PROCEDURE: #1 wide resection proximal femur, #2 right total hip arthroplasty    HPI: Benjamin is a 72-year-old man here with his wife today for follow-up of his right total hip arthroplasty by Dr. Luke.  He has a history of metastatic renal cell carcinoma.  He just started a new medicine for that on Wednesday.  He reports he is doing well after his surgery.  His pain is manageable.  He rates it at 3 out of 10 at most.  He is using a walker for ambulation.  He is working with physical therapy.  He thought he would be further along.  He is taking the Lovenox as directed.  No other concerns.    Physical Exam: Benjamin is a pleasant 72-year-old man who is alert and oriented no apparent distress.  He has a mildly antalgic gait with wheeled walker today.  His right hip incision is well-healed with no erythema or drainage.  He has no significant swelling or ecchymosis.  He is nontender to palpation.  Gentle passive rotation of the hip is nontender and shows 15 degrees of internal and external rotation.  No calf tenderness.  He is neurovascular intact distally.    Pathology:   Final Diagnosis   Right proximal femur, resection:  - Femoral head with degenerative and therapy associated changes  - Negative for residual malignancy        Imaging: We did review his immediate postop images, AP pelvis and AP and lateral of the right hip.  This shows a right total hip arthroplasty in excellent alignment with 2 screw fixation in the cup.  No sign of fracture or lucency.    Impression: 72-year-old male with history of  metastatic renal cell carcinoma doing very well status post right total hip arthroplasty    Plan: I explained to Benjamin that he is doing extremely well.  His pain is well controlled.  He can work with physical therapy to wean off the walker and progress his activity.  He will continue with his hip precautions as directed.  We talked about antibiotics as needed for dental procedures.  He can drive when he feels safe and is off the pain medicine.  He will follow-up in 6 weeks for check of his progress and then at 1 year for an x-ray.  They understand and agree with the plan of care.  All questions answered.        Eloina Lazcano PA-C

## 2022-04-01 NOTE — PROGRESS NOTES
Chief Complaint: Right hip check  POSTOPERATIVE DIAGNOSIS: #1 right femoral head and neck metastatic carcinoma, #2 right hip osteoarthritis     3/14/22 PROCEDURE: #1 wide resection proximal femur, #2 right total hip arthroplasty    HPI: Benjamin is a 72-year-old man here with his wife today for follow-up of his right total hip arthroplasty by Dr. Luke.  He has a history of metastatic renal cell carcinoma.  He just started a new medicine for that on Wednesday.  He reports he is doing well after his surgery.  His pain is manageable.  He rates it at 3 out of 10 at most.  He is using a walker for ambulation.  He is working with physical therapy.  He thought he would be further along.  He is taking the Lovenox as directed.  No other concerns.    Physical Exam: Benjamin is a pleasant 72-year-old man who is alert and oriented no apparent distress.  He has a mildly antalgic gait with wheeled walker today.  His right hip incision is well-healed with no erythema or drainage.  He has no significant swelling or ecchymosis.  He is nontender to palpation.  Gentle passive rotation of the hip is nontender and shows 15 degrees of internal and external rotation.  No calf tenderness.  He is neurovascular intact distally.    Pathology:   Final Diagnosis   Right proximal femur, resection:  - Femoral head with degenerative and therapy associated changes  - Negative for residual malignancy        Imaging: We did review his immediate postop images, AP pelvis and AP and lateral of the right hip.  This shows a right total hip arthroplasty in excellent alignment with 2 screw fixation in the cup.  No sign of fracture or lucency.    Impression: 72-year-old male with history of metastatic renal cell carcinoma doing very well status post right total hip arthroplasty    Plan: I explained to Benjamin that he is doing extremely well.  His pain is well controlled.  He can work with physical therapy to wean off the walker and progress his activity.  He will  continue with his hip precautions as directed.  We talked about antibiotics as needed for dental procedures.  He can drive when he feels safe and is off the pain medicine.  He will follow-up in 6 weeks for check of his progress and then at 1 year for an x-ray.  They understand and agree with the plan of care.  All questions answered.

## 2022-04-01 NOTE — NURSING NOTE
Reason For Visit:   Chief Complaint   Patient presents with     Surgical Followup     2 week POP // wide resection right femur tumor and right YO / DOS: 314/2022     RECHECK     patient feels he isn't progressing as quickly as he had hoped        There were no vitals taken for this visit.    Pain Assessment  Patient Currently in Pain: Yes  0-10 Pain Scale: 3    Thom Yee, EMT

## 2022-04-03 ENCOUNTER — HEALTH MAINTENANCE LETTER (OUTPATIENT)
Age: 72
End: 2022-04-03

## 2022-10-03 ENCOUNTER — HEALTH MAINTENANCE LETTER (OUTPATIENT)
Age: 72
End: 2022-10-03

## 2023-03-07 ENCOUNTER — TRANSCRIBE ORDERS (OUTPATIENT)
Dept: OTHER | Age: 73
End: 2023-03-07

## 2023-03-07 ENCOUNTER — MEDICAL CORRESPONDENCE (OUTPATIENT)
Dept: HEALTH INFORMATION MANAGEMENT | Facility: CLINIC | Age: 73
End: 2023-03-07
Payer: COMMERCIAL

## 2023-03-07 DIAGNOSIS — R79.89 ABNORMAL LFTS: Primary | ICD-10-CM

## 2023-03-07 DIAGNOSIS — R74.8 ELEVATED ALKALINE PHOSPHATASE LEVEL: ICD-10-CM

## 2023-03-07 DIAGNOSIS — C64.2 CLEAR CELL CARCINOMA OF LEFT KIDNEY (H): ICD-10-CM

## 2023-03-27 ENCOUNTER — OFFICE VISIT (OUTPATIENT)
Dept: GASTROENTEROLOGY | Facility: CLINIC | Age: 73
End: 2023-03-27
Payer: COMMERCIAL

## 2023-03-27 VITALS
HEART RATE: 78 BPM | HEIGHT: 65 IN | WEIGHT: 166 LBS | SYSTOLIC BLOOD PRESSURE: 147 MMHG | BODY MASS INDEX: 27.66 KG/M2 | OXYGEN SATURATION: 98 % | DIASTOLIC BLOOD PRESSURE: 64 MMHG

## 2023-03-27 DIAGNOSIS — C64.2 CLEAR CELL CARCINOMA OF LEFT KIDNEY (H): ICD-10-CM

## 2023-03-27 DIAGNOSIS — R74.8 ELEVATED ALKALINE PHOSPHATASE LEVEL: ICD-10-CM

## 2023-03-27 DIAGNOSIS — R79.89 ABNORMAL LFTS: ICD-10-CM

## 2023-03-27 PROCEDURE — 99205 OFFICE O/P NEW HI 60 MIN: CPT | Performed by: INTERNAL MEDICINE

## 2023-03-27 RX ORDER — TRAMADOL HYDROCHLORIDE 50 MG/1
TABLET ORAL
COMMUNITY
Start: 2023-03-01

## 2023-03-27 RX ORDER — LOSARTAN POTASSIUM 25 MG/1
25 TABLET ORAL EVERY MORNING
COMMUNITY
Start: 2023-01-25

## 2023-03-27 RX ORDER — LEVOTHYROXINE SODIUM 50 UG/1
TABLET ORAL
COMMUNITY
Start: 2022-12-29

## 2023-03-27 RX ORDER — SULFAMETHOXAZOLE/TRIMETHOPRIM 800-160 MG
TABLET ORAL
COMMUNITY
Start: 2023-01-30

## 2023-03-27 RX ORDER — PREDNISONE 10 MG/1
10 TABLET ORAL EVERY MORNING
COMMUNITY
Start: 2023-03-21

## 2023-03-27 ASSESSMENT — PAIN SCALES - GENERAL: PAINLEVEL: NO PAIN (0)

## 2023-03-27 NOTE — NURSING NOTE
"Chief Complaint   Patient presents with     New Patient       Vitals:    03/27/23 1311   BP: (!) 147/64   Pulse: 78   SpO2: 98%   Weight: 75.3 kg (166 lb)   Height: 1.651 m (5' 5\")       Body mass index is 27.62 kg/m .    PHQ-2 Score:     PHQ-2 ( 1999 [employer]) 3/27/2023 2/4/2022   Q1: Little interest or pleasure in doing things 0 0   Q2: Feeling down, depressed or hopeless 0 0   PHQ-2 Score 0 0       P[employer]ha [employer]    "

## 2023-03-27 NOTE — PROGRESS NOTES
"[facility]       Hepatology Clinic    Date of Service: 3/27/2023     Referring Provider: Dr. Nunez  Primary Care Provider: Venu Israel    History of Present Illness     Mr. Kapoor is sent in consultation by Dr. 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  He is accompanied by his wife.    Please see the recent clinic summary by his oncologist, Dr. Nunez, regarding his cancer diagnosis and treatment.  He had been started on pembrolizumab in December 2021, and this was stopped in October 2022 because of liver test abnormalities. In July 2022, he underwent a cholecystectomy because of possible acalculous cholecystitis and cholestatic liver enzyme abnormalities; an IOC was apparently negative at that time.  The pathology showed \"Moderate chronic cholecystitis.\"    He subsequently underwent a liver biopsy in Dec. 2022 (see below). He was started on Prednisone 40 mg daily after the biopsy, and has not tapered down to 10 mg daily.    He reports no prior history of liver disease.  There is no family history of liver disease.  His constitutional,GI, and hepatic review of systems are negative.  He reports no recent history of alcohol abuse.      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    Patient Active Problem List   Diagnosis     Status post surgery       Surgical History:  Past Surgical History:   Procedure Laterality Date     GENITOURINARY SURGERY      CT biopsy kidney     RESECT TUMOR, ARTHROPLASTY HIP Right 3/14/2022    Procedure: Wide resection right femur tumor and right total hip arthroplasty;  Surgeon: Kaveh Luke MD;  Location:  OR       Social History:  Social History     Tobacco Use " "    Smoking status: Former     Smokeless tobacco: Never     Tobacco comments:     1981 quit   Substance Use Topics     Drug use: Not Currently       Family History:  No family history on file.    Medications:  Current Outpatient Medications   Medication     diclofenac (VOLTAREN) 1 % topical gel     levothyroxine (SYNTHROID/LEVOTHROID) 50 MCG tablet     losartan (COZAAR) 25 MG tablet     predniSONE (DELTASONE) 10 MG tablet     sulfamethoxazole-trimethoprim (BACTRIM DS) 800-160 MG tablet     traMADol (ULTRAM) 50 MG tablet     acetaminophen (TYLENOL) 325 MG tablet     amLODIPine (NORVASC) 5 MG tablet     axitinib (INLYTA) 5 MG tablet     hydrOXYzine (ATARAX) 10 MG tablet     oxyCODONE (ROXICODONE) 5 MG tablet     polyethylene glycol (MIRALAX) 17 g packet     senna-docusate (SENOKOT-S/PERICOLACE) 8.6-50 MG tablet     No current facility-administered medications for this visit.           Objective:         Vitals:    03/27/23 1311   BP: (!) 147/64   Pulse: 78   SpO2: 98%   Weight: 75.3 kg (166 lb)   Height: 1.651 m (5' 5\")     Body mass index is 27.62 kg/m .     Physical Exam  Constitutional: Well-developed, well-nourished, in no apparent distress.    HEENT: Normocephalic.  No scleral icterus. Moist oral mucosa.  GI:  Abdomen soft, non-distended, non-tender. No overt hepatosplenomegaly.     Skin:  No rash noted.  No spider nevi noted.    Peripheral Vascular: No lower extremity edema.   Musculoskeletal:  ROM intact, apparently normal muscle bulk    Psychiatric: Normal mood and affect. Behavior is normal.  Neuro: No asterixis    Labs and Diagnostic tests:  Prior liver tests reviewed. His AMA was negative    Liver biopsy 12-28-22 (outside report):    Final Diagnosis     Liver, core biopsy:  --- Mild focal portal chronic inflammation with mild to moderate bile ductular proliferation, sinusoidal, portal and focal periportal fibrosis (stage 1-2 of 4).  --- Minimal to mild (1+ to 2+) hemosiderosis in hepatocytes and minimal " (1+) focal hemosiderosis in sinusoidal Kupffer cells identified by iron stain (See comment.)           Assessment and Plan:    1.  Cholestatic liver enzyme abnormalities.  His marked liver test abnormalities began in about June 2022 while he was on pembrolizumab.  He has been off that medication for several months, but the liver test abnormalities have been persistent.  He has had some improvement on the prednisone, although is not clear that this is a steroid-responsive disease.  His findings are not classic for checkpoint inhibitor hepatitis.    The plan for today is to obtain his outside liver biopsy for review here in liver pathology conference.  We will also obtain his outside MRI scans to review at liver radiology conference.  I did do not believe he has had a specific MRCP, and thus I have ordered one to be done in the next few weeks.    We will be in contact with his oncologist, Dr. Nunez ([phone]) after further evaluation. I think is is reasonable for him to remain on Prednisone 10 mg daily for now.        About 60 minutes spent today with patient, reviewing results, and coordinating care.    This note was created with voice recognition software, and while reviewed for accuracy, typos may remain.        Luan Lam MD  Professor of Medicine  [facility]  Division of Gastroenterology, Hepatology, and Nutrition

## 2023-03-27 NOTE — LETTER
"    3/27/2023         RE: Maximilian Kapoor  [address]  [address]  [facility] [address]        Dear Colleague,    Thank you for referring your patient, Maximilian Kapoor, to the [facility] [place]. Please see a copy of my visit note below.    [facility]       Hepatology Clinic    Date of Service: 3/27/2023     Referring Provider: Dr. Nunez  Primary Care Provider: Venu Israel    History of Present Illness     Mr. Kapoor is sent in consultation by Dr. 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  He is accompanied by his wife.    Please see the recent clinic summary by his oncologist, Dr. Nunez, regarding his cancer diagnosis and treatment.  He had been started on pembrolizumab in December 2021, and this was stopped in October 2022 because of liver test abnormalities. In July 2022, he underwent a cholecystectomy because of possible acalculous cholecystitis and cholestatic liver enzyme abnormalities; an IOC was apparently negative at that time.  The pathology showed \"Moderate chronic cholecystitis.\"    He subsequently underwent a liver biopsy in Dec. 2022 (see below). He was started on Prednisone 40 mg daily after the biopsy, and has not tapered down to 10 mg daily.    He reports no prior history of liver disease.  There is no family history of liver disease.  His constitutional,GI, and hepatic review of systems are negative.  He reports no recent history of alcohol abuse.      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    Patient Active Problem List   Diagnosis     Status post surgery       Surgical History:  Past Surgical History:   Procedure Laterality Date     " "GENITOURINARY SURGERY      CT biopsy kidney     RESECT TUMOR, ARTHROPLASTY HIP Right 3/14/2022    Procedure: Wide resection right femur tumor and right total hip arthroplasty;  Surgeon: Kaveh Luke MD;  Location: [facility] OR       Social History:  Social History     Tobacco Use     Smoking status: Former     Smokeless tobacco: Never     Tobacco comments:     1981 quit   Substance Use Topics     Drug use: Not Currently       Family History:  No family history on file.    Medications:  Current Outpatient Medications   Medication     diclofenac (VOLTAREN) 1 % topical gel     levothyroxine (SYNTHROID/LEVOTHROID) 50 MCG tablet     losartan (COZAAR) 25 MG tablet     predniSONE (DELTASONE) 10 MG tablet     sulfamethoxazole-trimethoprim (BACTRIM DS) 800-160 MG tablet     traMADol (ULTRAM) 50 MG tablet     acetaminophen (TYLENOL) 325 MG tablet     amLODIPine (NORVASC) 5 MG tablet     axitinib (INLYTA) 5 MG tablet     hydrOXYzine (ATARAX) 10 MG tablet     oxyCODONE (ROXICODONE) 5 MG tablet     polyethylene glycol (MIRALAX) 17 g packet     senna-docusate (SENOKOT-S/PERICOLACE) 8.6-50 MG tablet     No current facility-administered medications for this visit.           Objective:         Vitals:    03/27/23 1311   BP: (!) 147/64   Pulse: 78   SpO2: 98%   Weight: 75.3 kg (166 lb)   Height: 1.651 m (5' 5\")     Body mass index is 27.62 kg/m .     Physical Exam  Constitutional: Well-developed, well-nourished, in no apparent distress.    HEENT: Normocephalic.  No scleral icterus. Moist oral mucosa.  GI:  Abdomen soft, non-distended, non-tender. No overt hepatosplenomegaly.     Skin:  No rash noted.  No spider nevi noted.    Peripheral Vascular: No lower extremity edema.   Musculoskeletal:  ROM intact, apparently normal muscle bulk    Psychiatric: Normal mood and affect. Behavior is normal.  Neuro: No asterixis    Labs and Diagnostic tests:  Prior liver tests reviewed. His AMA was negative    Liver biopsy 12-28-22 (outside " report):    Final Diagnosis     Liver, core biopsy:  --- Mild focal portal chronic inflammation with mild to moderate bile ductular proliferation, sinusoidal, portal and focal periportal fibrosis (stage 1-2 of 4).  --- Minimal to mild (1+ to 2+) hemosiderosis in hepatocytes and minimal (1+) focal hemosiderosis in sinusoidal Kupffer cells identified by iron stain (See comment.)           Assessment and Plan:    1.  Cholestatic liver enzyme abnormalities.  His marked liver test abnormalities began in about June 2022 while he was on pembrolizumab.  He has been off that medication for several months, but the liver test abnormalities have been persistent.  He has had some improvement on the prednisone, although is not clear that this is a steroid-responsive disease.  His findings are not classic for checkpoint inhibitor hepatitis.    The plan for today is to obtain his outside liver biopsy for review here in liver pathology conference.  We will also obtain his outside MRI scans to review at liver radiology conference.  I did do not believe he has had a specific MRCP, and thus I have ordered one to be done in the next few weeks.    We will be in contact with his oncologist, Dr. Nunez ([phone]) after further evaluation. I think is is reasonable for him to remain on Prednisone 10 mg daily for now.        About 60 minutes spent today with patient, reviewing results, and coordinating care.    This note was created with voice recognition software, and while reviewed for accuracy, typos may remain.        Luan Lam MD  Professor of Medicine  [facility]  Division of Gastroenterology, Hepatology, and Nutrition

## 2023-03-27 NOTE — PATIENT INSTRUCTIONS
Summary:    1. The cause of your abnormal liver tests is unclear at this time, but I am recommending further testing.    2. I would continue prednisone 10 mg daily for now.    3. We will obtain your outside MRI images to review. I anticipate that we will want to get a specialized MRI (MRCP) in the next few weeks.    4. We will review your prior liver biopsy at our pathology conference.    5. I anticipate that we will have a follow up visit (perhaps via video) next month, based on these.    If you have any questions about your liver disease care or tests, you can call the clinic at [phone].     Luan Lam MD  Professor of Medicine  [facility]  Division of Gastroenterology, Hepatology and Nutrition

## 2023-03-29 ENCOUNTER — TELEPHONE (OUTPATIENT)
Dept: GASTROENTEROLOGY | Facility: CLINIC | Age: 73
End: 2023-03-29
Payer: COMMERCIAL

## 2023-03-29 NOTE — TELEPHONE ENCOUNTER
"MERLE and madiha sent (1st attempt) regarding GI clinic follow up    Schedule:  \"Return liver\" video visit with Dr. Lam for follow up (around 4/17/23)  "

## 2023-03-31 ENCOUNTER — LAB REQUISITION (OUTPATIENT)
Dept: LAB | Facility: CLINIC | Age: 73
End: 2023-03-31
Payer: COMMERCIAL

## 2023-03-31 PROCEDURE — 88321 CONSLTJ&REPRT SLD PREP ELSWR: CPT | Performed by: PATHOLOGY

## 2023-04-03 ENCOUNTER — DOCUMENTATION ONLY (OUTPATIENT)
Dept: GASTROENTEROLOGY | Facility: CLINIC | Age: 73
End: 2023-04-03
Payer: COMMERCIAL

## 2023-04-03 LAB
PATH REPORT.COMMENTS IMP SPEC: NORMAL
PATH REPORT.FINAL DX SPEC: NORMAL
PATH REPORT.GROSS SPEC: NORMAL
PATH REPORT.MICROSCOPIC SPEC OTHER STN: NORMAL
PATH REPORT.RELEVANT HX SPEC: NORMAL
PATH REPORT.RELEVANT HX SPEC: NORMAL
PATH REPORT.SITE OF ORIGIN SPEC: NORMAL

## 2023-04-03 NOTE — PROGRESS NOTES
"Hepatology Staff      We were sent this patient in consultation because of abnormal liver tests in a cholestatic pattern. In addition, Dr. Chowdhury, a specialty liver pathologist, interpreted his liver biopsy as follows:    \"The findings in keeping with biliary tree injury, with features that could either be at the level of small intrahepatic ducts but one cannot rule out more distal location at the hilum or extrahepatic ducts.\"    It is therefore important to rule out a biliary stricture or other diseases of the bile ducts. An MRCP is the preferred test. Although he has had outside MRI's, these did not include an MRCP and are thus not sufficient to rule out these biliary issues.    His MRCP was denied by his insurance carrier (see below). I will ask Ms. Benoit to contact the insurance company and show them this note to justify the MRCP that I ordered.    _________________________      Dr. Lam,     The authorization for procedure MR ABDOMEN MRCP W/O & W CONTRASTon date of service 22460 has been denied. We were unsuccessful in obtaining approval through clinical review. A [id number] review can be done by calling the insurance/third party authorization vendor with the following information:     Insurance: [employer] Medicare   Auth Vendor: [employer]   Phone: 1- [phone]   Due: Respond Before 4/5/2023     Clinicals already Provided   Visit Notes:                  12/28/2022 -01/26/2023-03/06/2023-03/21/2023 ALL WITH DR. Nunez Also 03/27/2023 with Dr. Lam   Results:                       12/28/2023 pathology   RESULTS 11/10/2022 MRI LIVER 11/10/2022 -02/27/2023   CT CHEST /ABD- PELVIS 09/26/2022- 12/14/2022   OFFICE  01/04/2023 - 12/21/2022     Patient ID: [id number]   Case/Ref #: [id number]     Patient contact: NO - still time for doc to get this approved   Patient response: N/A   Patient Phone #: [phone]     Denial Reason:   The request cannot be   approved because:   A study similar to the one " requested has already been approved for you. A separate   approval letter has been sent to you. We need results of the prior approved study that   show the need for repeat imaging       If you feel you have additional clinical information that could get this denial overturned, please complete the Peer to Peer.   If you choose not to do the P2P, please let me know as soon as possible so that we can reach out to the patient and advise them of their denial.

## 2023-04-05 ENCOUNTER — ANCILLARY PROCEDURE (OUTPATIENT)
Dept: MRI IMAGING | Facility: CLINIC | Age: 73
End: 2023-04-05
Attending: INTERNAL MEDICINE
Payer: COMMERCIAL

## 2023-04-05 ENCOUNTER — LAB (OUTPATIENT)
Dept: LAB | Facility: CLINIC | Age: 73
End: 2023-04-05
Payer: COMMERCIAL

## 2023-04-05 DIAGNOSIS — R74.8 ELEVATED ALKALINE PHOSPHATASE LEVEL: ICD-10-CM

## 2023-04-05 DIAGNOSIS — R79.89 ABNORMAL LFTS: ICD-10-CM

## 2023-04-05 DIAGNOSIS — C64.2 CLEAR CELL CARCINOMA OF LEFT KIDNEY (H): ICD-10-CM

## 2023-04-05 LAB
ALBUMIN SERPL BCG-MCNC: 4.3 G/DL (ref 3.5–5.2)
ALP SERPL-CCNC: 395 U/L (ref 40–129)
ALT SERPL W P-5'-P-CCNC: 145 U/L (ref 10–50)
ANION GAP SERPL CALCULATED.3IONS-SCNC: 9 MMOL/L (ref 7–15)
AST SERPL W P-5'-P-CCNC: 113 U/L (ref 10–50)
BILIRUB SERPL-MCNC: 0.8 MG/DL
BUN SERPL-MCNC: 15.6 MG/DL (ref 8–23)
CALCIUM SERPL-MCNC: 10.2 MG/DL (ref 8.8–10.2)
CHLORIDE SERPL-SCNC: 101 MMOL/L (ref 98–107)
CREAT SERPL-MCNC: 1.02 MG/DL (ref 0.67–1.17)
DEPRECATED HCO3 PLAS-SCNC: 28 MMOL/L (ref 22–29)
GFR SERPL CREATININE-BSD FRML MDRD: 78 ML/MIN/1.73M2
GGT SERPL-CCNC: 2411 U/L (ref 8–61)
GLUCOSE SERPL-MCNC: 109 MG/DL (ref 70–99)
POTASSIUM SERPL-SCNC: 4.6 MMOL/L (ref 3.4–5.3)
PROT SERPL-MCNC: 7.2 G/DL (ref 6.4–8.3)
SODIUM SERPL-SCNC: 138 MMOL/L (ref 136–145)

## 2023-04-05 PROCEDURE — 36415 COLL VENOUS BLD VENIPUNCTURE: CPT | Performed by: PATHOLOGY

## 2023-04-05 PROCEDURE — A9585 GADOBUTROL INJECTION: HCPCS | Performed by: RADIOLOGY

## 2023-04-05 PROCEDURE — 80053 COMPREHEN METABOLIC PANEL: CPT | Performed by: PATHOLOGY

## 2023-04-05 PROCEDURE — 74183 MRI ABD W/O CNTR FLWD CNTR: CPT | Performed by: RADIOLOGY

## 2023-04-05 PROCEDURE — 82977 ASSAY OF GGT: CPT | Performed by: PATHOLOGY

## 2023-04-05 PROCEDURE — 86381 MITOCHONDRIAL ANTIBODY EACH: CPT | Performed by: PATHOLOGY

## 2023-04-05 RX ORDER — GADOBUTROL 604.72 MG/ML
7.5 INJECTION INTRAVENOUS ONCE
Status: COMPLETED | OUTPATIENT
Start: 2023-04-05 | End: 2023-04-05

## 2023-04-05 RX ADMIN — GADOBUTROL 7.5 ML: 604.72 INJECTION INTRAVENOUS at 14:29

## 2023-04-05 NOTE — DISCHARGE INSTRUCTIONS
MRI Contrast Discharge Instructions    The IV contrast you received today will pass out of your body in your  urine. This will happen in the next 24 hours. You will not feel this process.  Your urine will not change color.    Drink at least 4 extra glasses of water or juice today (unless your doctor  has restricted your fluids). This reduces the stress on your kidneys.  You may take your regular medicines.    If you are on dialysis: It is best to have dialysis today.    If you have a reaction: Most reactions happen right away. If you have  any new symptoms after leaving the hospital (such as hives or swelling),  call your hospital at the correct number below. Or call your family doctor.  If you have breathing distress or wheezing, call 911.    Special instructions: ***    I have read and understand the above information.    Signature:______________________________________ Date:___________    Staff:__________________________________________ Date:___________     Time:__________    [facility] Departments:    ___[place]: [phone]  ___[facility]: [phone]  ___[place]: [phone] ___[facility]: [phone]  ___[facility]: [phone]  ___[facility]: [phone]  ___[phone]  ___[facility]: [phone]  ___[phone]

## 2023-04-06 NOTE — TELEPHONE ENCOUNTER
LVM and mychart sent (2nd attempt)     Will remove follow up request due to max attempts    Please see below for scheduling instructions

## 2023-04-07 LAB — MITOCHONDRIA M2 IGG SER-ACNC: <1 U/ML

## 2023-04-07 NOTE — RESULT ENCOUNTER NOTE
Mr. Kapoor:    I tried calling you just now, but was rolled over to voice mail, so I am sending this information via My Chart.    The MRI (MCRP) did not show evidence of problems in your large bile ducts. It does suggest that the kidney tumor is slightly enlarged since your last scan.    We reviewed your prior liver biopsy today at our multidisciplinary liver pathology conference. 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 This is a rare condition, and the effect of anti-inflammatory treatments (such as prednisone) is unclear.     Fortunately, the biopsy did not show significant fibrosis in the liver, and your blood tests suggest that your liver function is not impaired.    I think it is reasonable for you to taper off the prednisone and start a medication called Ursodiol, which can help some patients with immune-mediated cholangitis. The Ursodiol is generally very well tolerated, and can be taken indefinitely.    I just talked with your Oncologist, Dr. Nunez, about these findings and recommendations. Her office will contact you about tapering the prednisone and starting the Ursodiol.    If you have any questions about your liver disease care or tests, you can call the clinic at [phone].     Best wishes,    Dr. Lam

## 2023-05-20 ENCOUNTER — HEALTH MAINTENANCE LETTER (OUTPATIENT)
Age: 73
End: 2023-05-20

## 2024-10-05 ENCOUNTER — HEALTH MAINTENANCE LETTER (OUTPATIENT)
Age: 74
End: 2024-10-05

## (undated) DEVICE — STRAP KNEE/BODY 31143004

## (undated) DEVICE — DRAPE U-DRAPE 1015NSD NON-STERILE

## (undated) DEVICE — ESU PENCIL W/SMOKE EVAC NEPTUNE STRYKER 0703-046-000

## (undated) DEVICE — PREP DURAPREP REMOVER 4OZ 8611

## (undated) DEVICE — DRSG STERI STRIP 1/2X4" R1547

## (undated) DEVICE — SU VICRYL 1 CT-1 36" UND J947H

## (undated) DEVICE — GLOVE PROTEXIS W/NEU-THERA 7.0  2D73TE70

## (undated) DEVICE — SU MONOCRYL 3-0 PS-2 18" UND Y497G

## (undated) DEVICE — NDL SPINAL 18GA 3.5" 405184

## (undated) DEVICE — DRSG AQUACEL AG HYDROFIBER 3.5X6" 422604

## (undated) DEVICE — SUCTION IRR SYSTEM W/O TIP INTERPULSE HANDPIECE 0210-100-000

## (undated) DEVICE — STRAP STIRRUP W/SLIP 30187-030

## (undated) DEVICE — LIGHT HANDLE X2

## (undated) DEVICE — DRSG AQUACEL AG HYDROFIBER  3.5X10" 422605

## (undated) DEVICE — BONE CLEANING TIP INTERPULSE  0210-010-000

## (undated) DEVICE — POSITIONER ABDUCTION PILLOW FOAM MED FP-ABDUCTM

## (undated) DEVICE — LINEN BACK PACK 5440

## (undated) DEVICE — LINEN TOWEL PACK X5 5464

## (undated) DEVICE — PREP SKIN SCRUB TRAY 4461A

## (undated) DEVICE — DRSG TEGADERM 4X10" 1627

## (undated) DEVICE — SU MONOCRYL 4-0 PS-2 18" UND Y496G

## (undated) DEVICE — PREP DURAPREP 26ML APL 8630

## (undated) DEVICE — SOL NACL 0.9% IRRIG 1000ML BOTTLE 2F7124

## (undated) DEVICE — SU VICRYL 2-0 CT-1 27" UND J259H

## (undated) DEVICE — PREP POVIDONE-IODINE 7.5% SCRUB 4OZ BOTTLE MDS093945

## (undated) DEVICE — BLADE SAW SAGITTAL STRK 25X90X1.27MM HD SYS 6 6125-127-090

## (undated) DEVICE — SPONGE LAP 18X18" X8435

## (undated) DEVICE — GLOVE PROTEXIS BLUE W/NEU-THERA 7.5  2D73EB75

## (undated) DEVICE — DRAPE IOBAN INCISE 23X17" 6650EZ

## (undated) DEVICE — GOWN XLG DISP 9545

## (undated) DEVICE — CANCELLOUS BONE SCREW
Type: IMPLANTABLE DEVICE | Site: HIP | Status: NON-FUNCTIONAL
Brand: TORX
Removed: 2022-03-14

## (undated) DEVICE — HOOD FLYTE W/PEELAWAY 408-800-100

## (undated) DEVICE — SU VICRYL 0 CT-1 36" J946H

## (undated) DEVICE — ESU GROUND PAD UNIVERSAL W/O CORD

## (undated) DEVICE — SOL WATER IRRIG 1000ML BOTTLE 2F7114

## (undated) DEVICE — PACK TOTAL HIP W/POUCH RIVERSIDE LATEX FREE

## (undated) DEVICE — DEVICE RETRIEVER HEWSON 71111579

## (undated) DEVICE — DECANTER VIAL 2006S

## (undated) DEVICE — SU VICRYL 0 CT-1 27" J340H

## (undated) DEVICE — CATH TRAY FOLEY SURESTEP 16FR WDRAIN BAG STLK LATEX A300316A

## (undated) RX ORDER — FENTANYL CITRATE 50 UG/ML
INJECTION, SOLUTION INTRAMUSCULAR; INTRAVENOUS
Status: DISPENSED
Start: 2022-03-14

## (undated) RX ORDER — LABETALOL HYDROCHLORIDE 5 MG/ML
INJECTION, SOLUTION INTRAVENOUS
Status: DISPENSED
Start: 2022-03-14

## (undated) RX ORDER — ACETAMINOPHEN 325 MG/1
TABLET ORAL
Status: DISPENSED
Start: 2022-03-14

## (undated) RX ORDER — OXYCODONE HYDROCHLORIDE 5 MG/1
TABLET ORAL
Status: DISPENSED
Start: 2022-03-14

## (undated) RX ORDER — TRANEXAMIC ACID 650 MG/1
TABLET ORAL
Status: DISPENSED
Start: 2022-03-14

## (undated) RX ORDER — HYDROMORPHONE HYDROCHLORIDE 1 MG/ML
INJECTION, SOLUTION INTRAMUSCULAR; INTRAVENOUS; SUBCUTANEOUS
Status: DISPENSED
Start: 2022-03-14